# Patient Record
Sex: FEMALE | Race: WHITE | URBAN - METROPOLITAN AREA
[De-identification: names, ages, dates, MRNs, and addresses within clinical notes are randomized per-mention and may not be internally consistent; named-entity substitution may affect disease eponyms.]

---

## 2017-03-29 ENCOUNTER — ONCOLOGY VISIT (OUTPATIENT)
Dept: ONCOLOGY | Facility: CLINIC | Age: 52
End: 2017-03-29
Attending: NURSE PRACTITIONER
Payer: COMMERCIAL

## 2017-03-29 VITALS
TEMPERATURE: 98.1 F | RESPIRATION RATE: 18 BRPM | OXYGEN SATURATION: 97 % | WEIGHT: 280.7 LBS | HEIGHT: 69 IN | HEART RATE: 76 BPM | DIASTOLIC BLOOD PRESSURE: 84 MMHG | SYSTOLIC BLOOD PRESSURE: 121 MMHG | BODY MASS INDEX: 41.57 KG/M2

## 2017-03-29 DIAGNOSIS — Z08 ENCOUNTER FOR FOLLOW-UP SURVEILLANCE OF CERVICAL CANCER: Primary | ICD-10-CM

## 2017-03-29 DIAGNOSIS — Z85.41 ENCOUNTER FOR FOLLOW-UP SURVEILLANCE OF CERVICAL CANCER: Primary | ICD-10-CM

## 2017-03-29 PROCEDURE — 99212 OFFICE O/P EST SF 10 MIN: CPT | Mod: ZF

## 2017-03-29 PROCEDURE — 99213 OFFICE O/P EST LOW 20 MIN: CPT | Mod: ZP | Performed by: NURSE PRACTITIONER

## 2017-03-29 RX ORDER — IBUPROFEN 800 MG
400 TABLET ORAL DAILY
COMMUNITY

## 2017-03-29 RX ORDER — DIPHENOXYLATE HYDROCHLORIDE AND ATROPINE SULFATE 2.5; .025 MG/1; MG/1
1 TABLET ORAL DAILY
COMMUNITY

## 2017-03-29 RX ORDER — ATORVASTATIN CALCIUM 40 MG/1
40 TABLET, FILM COATED ORAL DAILY
COMMUNITY

## 2017-03-29 ASSESSMENT — PAIN SCALES - GENERAL: PAINLEVEL: NO PAIN (1)

## 2017-03-29 NOTE — MR AVS SNAPSHOT
"              After Visit Summary   3/29/2017    Hanh Corea    MRN: [id number]           Patient Information     Date Of Birth          1965        Visit Information        Provider Department      3/29/2017 2:40 PM Cathy Dickerson APRN CNP [facility]        Today's Diagnoses     Encounter for follow-up surveillance of cervical cancer    -  1       Follow-ups after your visit        Who to contact     If you have questions or need follow up information about today's clinic visit or your schedule please contact [facility] directly at [phone].  Normal or non-critical lab and imaging results will be communicated to you by collegefeedhart, letter or phone within 4 business days after the clinic has received the results. If you do not hear from us within 7 days, please contact the clinic through collegefeedhart or phone. If you have a critical or abnormal lab result, we will notify you by phone as soon as possible.  Submit refill requests through [employer] or call your pharmacy and they will forward the refill request to us. Please allow 3 business days for your refill to be completed.          Additional Information About Your Visit        MyChart Information     [employer] gives you secure access to your electronic health record. If you see a primary care provider, you can also send messages to your care team and make appointments. If you have questions, please call your primary care clinic.  If you do not have a primary care provider, please call [phone] and they will assist you.        Care EveryWhere ID     This is your Care EveryWhere ID. This could be used by other organizations to access your [facility] medical records  [id number]        Your Vitals Were     Pulse Temperature Respirations Height Last Period Pulse Oximetry    76 98.1  F (36.7  C) (Oral) 18 1.765 m (5' 9.49\") 12/23/2013 97%    BMI (Body Mass Index)                   40.87 kg/m2            Blood Pressure " from Last 3 Encounters:   03/29/17 121/84   06/08/16 179/80   10/20/15 136/83    Weight from Last 3 Encounters:   03/29/17 127.3 kg (280 lb 11.2 oz)   06/08/16 128.7 kg (283 lb 11.7 oz)   10/20/15 133 kg (293 lb 4.8 oz)              Today, you had the following     No orders found for display         Today's Medication Changes          These changes are accurate as of: 3/29/17  3:34 PM.  If you have any questions, ask your nurse or doctor.               Stop taking these medicines if you haven't already. Please contact your care team if you have questions.     simvastatin 40 MG tablet   Commonly known as:  ZOCOR   Stopped by:  Cathy Dickerson APRN CNP                    Primary Care Provider Office Phone # Fax #    Michelle Roberts [phone]       [facility] [address] [address]        Thank you!     Thank you for choosing [facility]  for your care. Our goal is always to provide you with excellent care. Hearing back from our patients is one way we can continue to improve our services. Please take a few minutes to complete the written survey that you may receive in the mail after your visit with us. Thank you!             Your Updated Medication List - Protect others around you: Learn how to safely use, store and throw away your medicines at www.disposemymeds.org.          This list is accurate as of: 3/29/17  3:34 PM.  Always use your most recent med list.                   Brand Name Dispense Instructions for use    aspirin 81 MG tablet      Take 81 mg by mouth daily       atorvastatin 40 MG tablet    LIPITOR     Take 40 mg by mouth daily       cycloSPORINE 0.05 % ophthalmic emulsion    RESTASIS     1 drop 2 times daily       flecainide 100 MG tablet    TAMBOCOR     Take 100 mg by mouth daily.       LANTUS SOLOSTAR 100 UNIT/ML injection   Generic drug:  insulin glargine     1 Month    Inject 60 units SQ at bedtime.       MAXIMUM RED  KRILL PO      Take 1 tablet by mouth daily       METFORMIN HCL PO      Take 1,000 mg by mouth 2 times daily (with meals)       MULTI-VITAMINS Tabs      Take 1 tablet by mouth daily       NovoLOG FLEXpen 100 UNITS/ML injection   Generic drug:  insulin aspart     1 Month    Inject 2.5 units/15 gms CHO with meals and snacks plus correction with meals.       ULTRA COQ10 75 MG Caps   Generic drug:  Ubiquinone      Take 75 mg by mouth daily       Vitamin D3 400 UNITS Caps      Take 400 Units by mouth daily
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
## 2017-06-17 ENCOUNTER — HEALTH MAINTENANCE LETTER (OUTPATIENT)
Age: 52
End: 2017-06-17

## 2017-09-23 ENCOUNTER — HEALTH MAINTENANCE LETTER (OUTPATIENT)
Age: 52
End: 2017-09-23

## 2017-12-30 ENCOUNTER — HEALTH MAINTENANCE LETTER (OUTPATIENT)
Age: 52
End: 2017-12-30

## 2018-03-31 ENCOUNTER — HEALTH MAINTENANCE LETTER (OUTPATIENT)
Age: 53
End: 2018-03-31

## 2018-07-07 ENCOUNTER — HEALTH MAINTENANCE LETTER (OUTPATIENT)
Age: 53
End: 2018-07-07

## 2018-10-13 ENCOUNTER — HEALTH MAINTENANCE LETTER (OUTPATIENT)
Age: 53
End: 2018-10-13

## 2019-02-15 ENCOUNTER — HEALTH MAINTENANCE LETTER (OUTPATIENT)
Age: 54
End: 2019-02-15

## 2019-10-01 ENCOUNTER — HEALTH MAINTENANCE LETTER (OUTPATIENT)
Age: 54
End: 2019-10-01

## 2019-11-08 ENCOUNTER — ONCOLOGY VISIT (OUTPATIENT)
Dept: ONCOLOGY | Facility: CLINIC | Age: 54
End: 2019-11-08
Attending: NURSE PRACTITIONER
Payer: COMMERCIAL

## 2019-11-08 VITALS
BODY MASS INDEX: 38.22 KG/M2 | RESPIRATION RATE: 16 BRPM | SYSTOLIC BLOOD PRESSURE: 144 MMHG | TEMPERATURE: 99.1 F | HEART RATE: 75 BPM | DIASTOLIC BLOOD PRESSURE: 82 MMHG | WEIGHT: 267 LBS | HEIGHT: 70 IN | OXYGEN SATURATION: 97 %

## 2019-11-08 DIAGNOSIS — Z08 ENCOUNTER FOR FOLLOW-UP SURVEILLANCE OF CERVICAL CANCER: Primary | ICD-10-CM

## 2019-11-08 DIAGNOSIS — Z85.41 ENCOUNTER FOR FOLLOW-UP SURVEILLANCE OF CERVICAL CANCER: Primary | ICD-10-CM

## 2019-11-08 PROCEDURE — G0463 HOSPITAL OUTPT CLINIC VISIT: HCPCS | Mod: ZF

## 2019-11-08 PROCEDURE — 99213 OFFICE O/P EST LOW 20 MIN: CPT | Mod: ZP | Performed by: NURSE PRACTITIONER

## 2019-11-08 PROCEDURE — G0145 SCR C/V CYTO,THINLAYER,RESCR: HCPCS | Performed by: NURSE PRACTITIONER

## 2019-11-08 RX ORDER — DILTIAZEM HCL 60 MG
TABLET ORAL
COMMUNITY
Start: 2019-10-22

## 2019-11-08 ASSESSMENT — ENCOUNTER SYMPTOMS
BACK PAIN: 1
LIGHT-HEADEDNESS: 0
EXERCISE INTOLERANCE: 0
HYPERTENSION: 0
ORTHOPNEA: 0
SLEEP DISTURBANCES DUE TO BREATHING: 0
STIFFNESS: 0
LEG PAIN: 0
JOINT SWELLING: 0
MUSCLE CRAMPS: 1
HYPOTENSION: 0
MYALGIAS: 1
NECK PAIN: 0
SYNCOPE: 0
ARTHRALGIAS: 0
MUSCLE WEAKNESS: 0
PALPITATIONS: 1

## 2019-11-08 ASSESSMENT — PAIN SCALES - GENERAL: PAINLEVEL: NO PAIN (0)

## 2019-11-08 ASSESSMENT — MIFFLIN-ST. JEOR: SCORE: 1890.73

## 2019-11-08 NOTE — PROGRESS NOTES
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 She completed treatment with surgery, EBRT, and brachytherapy on 14. She presents today for a six month surveillance visit.    HPI: Hanh comes to the clinic feeling well. She has been following with her PCP at home, but returns because she is concerned about \"extra vaginal tissue\" and the inability to perform a Pap smear. No vaginal bleeding or pelvic pain. Notes \"extra tissue\" began to grow after her radiation despite the use of a dilator. Not sexually active. Up to date on influenza vaccine. Denies unintended weight loss, fatigue, weakness, changes in vision or hearing, shortness of breath, cough, chest pain, abdominal pain, dyspepsia, nausea, vomiting, constipation, diarrhea, bloating, dysuria, urinary frequency or urgency, hematuria, pelvic pain, lower back pain, vaginal bleeding, vaginal discharge, numbness or tingling, or swelling of the extremities.      Brief Oncology History:  Initially presented for evaluation of increased vaginal discharge and spotting between cycles in 2013. She states that she has had some discharge over the last year. The discharge has been odorous, and she needs to wear a pad throughout the day. Sometimes some bleeding noted with the discharge. Her PCP referred her to OB/GYN, and ordered pelvic US. On 13 US showed borderline thickening of endometrial stripe (1.6 cm) which was heterogenous and contained small cystic spaces, Nabothian cysts noted throughout. Uterus measured 9.2 x 6 x 6.9 cm. Endometrial biopsy on 1/15/14, noted that the cervix was extremely friable. Endometrial biopsy showed adenocarcinoma, endometrioid type, villoglandular variant, FIGO grade 1. On 14, she followed up with gyn, who noted that there was an " exophytic friable fungating lesion on anterior portion of pt's cervix. Cervical polyp biopsy was obtained. This returned adenocarcinoma, endocervical vs endometrial. 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   1/28/14 CT C/A/P Impression:   1. Mild thickening of the lower uterine segment and cervix most likely corresponds to the patient's known endometrial and cervical adenocarcinoma.  2. No evidence for metastasis in the chest, abdomen pelvis.  3. Incidental short segment small bowel-small bowel intussusception without evidence for small bowel obstruction. Most of this intussusceptions are transient.  2/11/14 Exploratory Laparotomy, total abdominal hysterectomy, bilateral salpingo-oopharectomy, bilateral pelvic and periaortic lymph node dissection, omental biopsy. The surgery was technically difficult due to morbid obesity with BMI of 40.0. Large amount of subcutaneous and intraperitoneal adiposity   SYNOPTIC SUMMARY:  Specimen: Uterus, cervix, bilateral fallopian tubes and ovaries, pelvic and periaortic lymph nodes.  Procedure: Total abdominal hysterectomy, bilateral salpingo-oophorectomy, pelvic and periaortic lymph node dissection, omentectomy.  Tumor size: 2.5 x 1.5 cm.  Tumor site: Endocervix.  Histologic type: Adenocarcinoma.  Histologic grade: G2, well differentiated.  Stromal invasion: Depth: 4 mm.  Horizontal extent: 25 mm.  Margins: Uninvolved by invasive carcinoma.  Lymph-vascular invasion: Not identified.  Pathologic staging (pTNM):  Primary tumor (pT): pT1b1: Clinically visible lesion less than or equal to 4.0 cm in greatest dimension.  Regional lymph nodes (pN): pN0: No regional lymph node metastasis.  Number of lymph nodes examined: 20.  Number of lymph nodes involved: 0.  Distant metastasis (pM): Not applicable.  Additional pathologic findings: Benign endometrial polyp formation and leiomyoma.  2/26/14: On 2/22, she was hospitalized in [place] for ileus.  6/9/14: Radiation oncology  consultation. S/P External beam Therapy in [place] subsequently referred for vaginal cuff boost at [facility]. Her indications for postoperative radiotherapy include adeoncarcinoma subtype as well as the fact that she had a simple hysterectomy. She has had 4500 cGy of EBT which was poorly tolerated. The vaginal cuff is a possible site of recurrence and a cuff boost may decrease the risk of recurrence there. Had she had a radical hysterectomy the upper 1/3 of the vagina is taken and in that case I do not usually boost with brachy. In this situation vag cuff brachy wasrecommended. Recommended imodium prn diarrhea. Diet modifications help to control stool. Has dilator for use. Anal fissure most irritating; just started steroid cream.   6/11/14: Completed 3 brachytherapy treatments. Mood intermittently depressed but has supportive family. She has recently increased her dose of Zoloft to 100 mg per PCP.   8/27/14: Completed radiation. Diarrhea has resolved. Pain with defecation. No rectal bleeding. History anal fissure by history only, not exam. Uses steroid cream prn. Notes deep hip pain she feels is r/t radiation. No libido and has insertional and deep dyspareunia. Tried lubrication. Has not tried estrogen cream. Trying to use dilator. Continues on Zoloft 100 mg. Has occasional dilator use. Has not had post treatment imaging since completing treatment 7/14.      11/05/14 CT C/A/P impression: 1) Mild circumferential rectal wall thickening and surrounding inflammatory change. Findings are nonspecific. 2) 1.5 cm lytic lesion containing soft tissue and gas in the anterior left scapula is most likely degenerative in etiology and appears nonaggressive. This could be followed with plain film. 3) Resolution of abdominal abscess since prior CT.     01/27/15 3 month surveillance visit for review of CT scan and PAP/Pelvic. Atypical-glandular cells(NOS). Other Non-Neoplastic Findings: Reactive cellular changes associated with  radiation.     2/10/15: Colpo with vaginal cuff bx: FINAL DIAGNOSIS: Vagina, apex, biopsy: -Necrosis, ulceration, and changes consistent with radiation      7/21/15: 3 month follow up visit.   10/20/15: 3 month follow up visit. Feeling well. Decrease libido and difficulty with arousal.   6/8/16: Pap NIL, HPV negative    Past Medical History:   Diagnosis Date     AV junctional tachycardia (H)     History of junctional tachycardia and SVT with ablation in [place] x 2. Follows with [facility].      Depression      Diabetes (H)      Endocervical adenocarcinoma (H) 2/11/14     Hyperlipidemia      Sleep apnea        Past Surgical History:   Procedure Laterality Date     CARPAL TUNNEL RELEASE RT/LT       CHOLECYSTECTOMY       GYN SURGERY       HYSTERECTOMY TOTAL ABDOMINAL, BILATERAL SALPINGO-OOPHORECTOMY, NODE DISSECTION, COMBINED  2/11/2014    Procedure: COMBINED HYSTERECTOMY TOTAL ABDOMINAL, SALPINGO-OOPHORECTOMY, NODE DISSECTION;  Exploratory Laparotomy, Total Abdominal Hysterectomy, Bilateral Salpingo-Oophrectomy, Pelvic And Para-Aortic Lymph Node Dissection, Exam Under Anesthesia, Omental Biopsy;  Surgeon: Karen Bella MD;  Location:  OR       Social History     Socioeconomic History     Marital status:      Spouse name: Not on file     Number of children: Not on file     Years of education: Not on file     Highest education level: Not on file   Occupational History     Not on file   Social Needs     Financial resource strain: Not on file     Food insecurity:     Worry: Not on file     Inability: Not on file     Transportation needs:     Medical: Not on file     Non-medical: Not on file   Tobacco Use     Smoking status: Never Smoker     Smokeless tobacco: Never Used   Substance and Sexual Activity     Alcohol use: No     Drug use: No     Sexual activity: Not on file   Lifestyle     Physical activity:     Days per week: Not on file     Minutes per session: Not on file     Stress: Not on file    Relationships     Social connections:     Talks on phone: Not on file     Gets together: Not on file     Attends Taoist service: Not on file     Active member of club or organization: Not on file     Attends meetings of clubs or organizations: Not on file     Relationship status: Not on file     Intimate partner violence:     Fear of current or ex partner: Not on file     Emotionally abused: Not on file     Physically abused: Not on file     Forced sexual activity: Not on file   Other Topics Concern     Not on file   Social History Narrative     Not on file       Family History   Problem Relation Age of Onset     Cancer Father         colon cancer     Cancer Paternal Grandfather         prostate  cancer       Current Outpatient Medications   Medication     aspirin 81 MG tablet     atorvastatin (LIPITOR) 40 MG tablet     Cholecalciferol (VITAMIN D3) 400 UNITS CAPS     cycloSPORINE (RESTASIS) 0.05 % ophthalmic emulsion     flecainide (TAMBOCOR) 100 MG tablet     insulin glargine (LANTUS SOLOSTAR) SOLN 100 UNIT/ML     Krill Oil (MAXIMUM RED KRILL PO)     METFORMIN HCL PO     Multiple Vitamin (MULTI-VITAMINS) TABS     NOVOLOG FLEXPEN SOLN 100 UNIT/ML     Ubiquinone (ULTRA COQ10) 75 MG CAPS     No current facility-administered medications for this visit.           Allergies   Allergen Reactions     Morphine Itching     Sulfa Drugs Nausea     [place]etta Itching and Rash     Exenatide Rash           Review of Systems  Answers for HPI/ROS submitted by the patient on 11/8/2019   General Symptoms: No  Skin Symptoms: No  HENT Symptoms: No  EYE SYMPTOMS: No  HEART SYMPTOMS: Yes  LUNG SYMPTOMS: No  INTESTINAL SYMPTOMS: No  URINARY SYMPTOMS: No  GYNECOLOGIC SYMPTOMS: No  BREAST SYMPTOMS: No  SKELETAL SYMPTOMS: Yes  BLOOD SYMPTOMS: No  NERVOUS SYSTEM SYMPTOMS: No  MENTAL HEALTH SYMPTOMS: No  Chest pain or pressure: No  Fast or irregular heartbeat: Yes  Pain in legs with walking: No  Trouble breathing while lying down: No  Fingers  "or toes appear blue: No  High blood pressure: No  Low blood pressure: No  Fainting: No  Murmurs: No  Pacemaker: No  Varicose veins: No  Edema or swelling: No  Wake up at night with shortness of breath: No  Light-headedness: No  Exercise intolerance: No  Back pain: Yes  Muscle aches: Yes  Neck pain: No  Swollen joints: No  Joint pain: No  Bone pain: No  Muscle cramps: Yes  Muscle weakness: No  Joint stiffness: No  Bone fracture: No    I have reviewed the patient's ROS and discussed all pertinent information as noted in the HPI.      OBJECTIVE:    Physical Exam:    BP (!) 144/82   Pulse 75   Temp 99.1  F (37.3  C) (Oral)   Resp 16   Ht 1.769 m (5' 9.65\")   Wt 121.1 kg (267 lb)   LMP 12/23/2013   SpO2 97%   BMI 38.70 kg/m      CONSTITUTIONAL: Alert non-toxic appearing female in no acute distress  HEAD: Normocephalic, atraumatic  NECK: Neck supple without lymphadenopathy  RESPIRATORY: Lungs clear to auscultation, no increased work of breathing noted  CV: Regular rate and rhythm, S1S2, no clicks, murmurs, rubs, or gallops; bilateral lower extremities without edema, dorsalis pedis pulses 2+ bilaterally  GASTROINTESTINAL: Normoactive bowel sounds x4 quadrants, abdomen obese, soft, non-distended, and non-tender to palpation without masses or organomegaly  GENITOURINARY: External genitalia and urethral meatus pink without lesions, masses, or excoriation. Vagina pale consistent with radiation changes, scarring noted to apex, shortened to roughly 2cm. Vaginal cuff without nodularity, masses, or lesions. Cervix surgically absent. Vaginal Pap obtained Bimanual exam reveals no masses or fullness. Rectovaginal exam confirms these findings.  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 Continue surveillance annually with Pap and pelvic/rectal exam. Today's Pap is pending. Reviewed signs and symptoms  of recurrence including but not limited to bleeding from vagina, bladder, or rectum, bloating, early satiety, swelling in the lower extremities, abdominal or lower back pain, changes in bowel or bladder patterns, shortness of breath, increased fatigue, unexplained weight loss, and night sweats. To restart dilator use three times weekly for five minutes at a time to prevent further vaginal shortening post radiation- given two different dilator sizes. Reviewed signs and symptoms for when she should contact the clinic or seek additional care. Patient to contact the clinic with any questions or concerns in the interim. We reviewed that she does not have \"extra vaginal tissue\"- she has foreshortening due to her history of radiation but this should not preclude a vaginal Pap. Given cancer treatment summary for her and her PCP.  2) Genetic testing: Risk factors have been assessed and the patient does not meet qualifications for genetic counseling based on NCCN guidelines.   3) Health maintenance issues discussed today include to follow up with PCP for co-morbid conditions and non-gynecologic issues. To have mammogram.  4) Patient verbalized understanding of and agreement with plan.    A total of 15 minutes of face to face time spent with patient, over 50% of which was spent in counseling and coordination of care.    ZULEMA Vaughn, FNP-C  Division of Gynecologic Oncology  [facility]  Pager: [phone]            "

## 2019-11-08 NOTE — LETTER
"2019       RE: Hanh Corea  [address] [facility] [address]     Dear Colleague,    Thank you for referring your patient, Hanh Corea, to the [facility]. Please see a copy of my visit note below.    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 She completed treatment with surgery, EBRT, and brachytherapy on 14. She presents today for a six month surveillance visit.    HPI: Hanh comes to the clinic feeling well. She has been following with her PCP at home, but returns because she is concerned about \"extra vaginal tissue\" and the inability to perform a Pap smear. No vaginal bleeding or pelvic pain. Notes \"extra tissue\" began to grow after her radiation despite the use of a dilator. Not sexually active. Up to date on influenza vaccine. Denies unintended weight loss, fatigue, weakness, changes in vision or hearing, shortness of breath, cough, chest pain, abdominal pain, dyspepsia, nausea, vomiting, constipation, diarrhea, bloating, dysuria, urinary frequency or urgency, hematuria, pelvic pain, lower back pain, vaginal bleeding, vaginal discharge, numbness or tingling, or swelling of the extremities.      Brief Oncology History:  Initially presented for evaluation of increased vaginal discharge and spotting between cycles in 2013. She states that she has had some discharge over the last year. The discharge has been odorous, and she needs to wear a pad throughout the day. Sometimes some bleeding noted with the discharge. Her PCP referred her to OB/GYN, and ordered pelvic US. On 13 US showed borderline thickening of endometrial stripe (1.6 cm) which was heterogenous and contained small cystic spaces, Nabothian cysts noted throughout. Uterus measured 9.2 x 6 x 6.9 cm. " Endometrial biopsy on 1/15/14, noted that the cervix was extremely friable. Endometrial biopsy showed adenocarcinoma, endometrioid type, villoglandular variant, FIGO grade 1. On 1/16/14, she followed up with gyn, who noted that there was an exophytic friable fungating lesion on anterior portion of pt's cervix. Cervical polyp biopsy was obtained. This returned adenocarcinoma, endocervical vs endometrial. 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   1/28/14 CT C/A/P Impression:   1. Mild thickening of the lower uterine segment and cervix most likely corresponds to the patient's known endometrial and cervical adenocarcinoma.  2. No evidence for metastasis in the chest, abdomen pelvis.  3. Incidental short segment small bowel-small bowel intussusception without evidence for small bowel obstruction. Most of this intussusceptions are transient.  2/11/14 Exploratory Laparotomy, total abdominal hysterectomy, bilateral salpingo-oopharectomy, bilateral pelvic and periaortic lymph node dissection, omental biopsy. The surgery was technically difficult due to morbid obesity with BMI of 40.0. Large amount of subcutaneous and intraperitoneal adiposity   SYNOPTIC SUMMARY:  Specimen: Uterus, cervix, bilateral fallopian tubes and ovaries, pelvic and periaortic lymph nodes.  Procedure: Total abdominal hysterectomy, bilateral salpingo-oophorectomy, pelvic and periaortic lymph node dissection, omentectomy.  Tumor size: 2.5 x 1.5 cm.  Tumor site: Endocervix.  Histologic type: Adenocarcinoma.  Histologic grade: G2, well differentiated.  Stromal invasion: Depth: 4 mm.  Horizontal extent: 25 mm.  Margins: Uninvolved by invasive carcinoma.  Lymph-vascular invasion: Not identified.  Pathologic staging (pTNM):  Primary tumor (pT): pT1b1: Clinically visible lesion less than or equal to 4.0 cm in greatest dimension.  Regional lymph nodes (pN): pN0: No regional lymph node metastasis.  Number of lymph nodes examined: 20.  Number of lymph  nodes involved: 0.  Distant metastasis (pM): Not applicable.  Additional pathologic findings: Benign endometrial polyp formation and leiomyoma.  2/26/14: On 2/22, she was hospitalized in [place] for ileus.  6/9/14: Radiation oncology consultation. S/P External beam Therapy in [place] subsequently referred for vaginal cuff boost at [facility]. Her indications for postoperative radiotherapy include adeoncarcinoma subtype as well as the fact that she had a simple hysterectomy. She has had 4500 cGy of EBT which was poorly tolerated. The vaginal cuff is a possible site of recurrence and a cuff boost may decrease the risk of recurrence there. Had she had a radical hysterectomy the upper 1/3 of the vagina is taken and in that case I do not usually boost with brachy. In this situation vag cuff brachy wasrecommended. Recommended imodium prn diarrhea. Diet modifications help to control stool. Has dilator for use. Anal fissure most irritating; just started steroid cream.   6/11/14: Completed 3 brachytherapy treatments. Mood intermittently depressed but has supportive family. She has recently increased her dose of Zoloft to 100 mg per PCP.   8/27/14: Completed radiation. Diarrhea has resolved. Pain with defecation. No rectal bleeding. History anal fissure by history only, not exam. Uses steroid cream prn. Notes deep hip pain she feels is r/t radiation. No libido and has insertional and deep dyspareunia. Tried lubrication. Has not tried estrogen cream. Trying to use dilator. Continues on Zoloft 100 mg. Has occasional dilator use. Has not had post treatment imaging since completing treatment 7/14.      11/05/14 CT C/A/P impression: 1) Mild circumferential rectal wall thickening and surrounding inflammatory change. Findings are nonspecific. 2) 1.5 cm lytic lesion containing soft tissue and gas in the anterior left scapula is most likely degenerative in etiology and appears nonaggressive. This could be followed with plain film. 3)  Resolution of abdominal abscess since prior CT.     01/27/15 3 month surveillance visit for review of CT scan and PAP/Pelvic. Atypical-glandular cells(NOS). Other Non-Neoplastic Findings: Reactive cellular changes associated with radiation.     2/10/15: Colpo with vaginal cuff bx: FINAL DIAGNOSIS: Vagina, apex, biopsy: -Necrosis, ulceration, and changes consistent with radiation      7/21/15: 3 month follow up visit.   10/20/15: 3 month follow up visit. Feeling well. Decrease libido and difficulty with arousal.   6/8/16: Pap NIL, HPV negative    Past Medical History:   Diagnosis Date     AV junctional tachycardia (H)     History of junctional tachycardia and SVT with ablation in [place] x 2. Follows with [facility].      Depression      Diabetes (H)      Endocervical adenocarcinoma (H) 2/11/14     Hyperlipidemia      Sleep apnea        Past Surgical History:   Procedure Laterality Date     CARPAL TUNNEL RELEASE RT/LT       CHOLECYSTECTOMY       GYN SURGERY       HYSTERECTOMY TOTAL ABDOMINAL, BILATERAL SALPINGO-OOPHORECTOMY, NODE DISSECTION, COMBINED  2/11/2014    Procedure: COMBINED HYSTERECTOMY TOTAL ABDOMINAL, SALPINGO-OOPHORECTOMY, NODE DISSECTION;  Exploratory Laparotomy, Total Abdominal Hysterectomy, Bilateral Salpingo-Oophrectomy, Pelvic And Para-Aortic Lymph Node Dissection, Exam Under Anesthesia, Omental Biopsy;  Surgeon: Karen Bella MD;  Location:  OR       Social History     Socioeconomic History     Marital status:      Spouse name: Not on file     Number of children: Not on file     Years of education: Not on file     Highest education level: Not on file   Occupational History     Not on file   Social Needs     Financial resource strain: Not on file     Food insecurity:     Worry: Not on file     Inability: Not on file     Transportation needs:     Medical: Not on file     Non-medical: Not on file   Tobacco Use     Smoking status: Never Smoker     Smokeless tobacco: Never Used    Substance and Sexual Activity     Alcohol use: No     Drug use: No     Sexual activity: Not on file   Lifestyle     Physical activity:     Days per week: Not on file     Minutes per session: Not on file     Stress: Not on file   Relationships     Social connections:     Talks on phone: Not on file     Gets together: Not on file     Attends Temple service: Not on file     Active member of club or organization: Not on file     Attends meetings of clubs or organizations: Not on file     Relationship status: Not on file     Intimate partner violence:     Fear of current or ex partner: Not on file     Emotionally abused: Not on file     Physically abused: Not on file     Forced sexual activity: Not on file   Other Topics Concern     Not on file   Social History Narrative     Not on file       Family History   Problem Relation Age of Onset     Cancer Father         colon cancer     Cancer Paternal Grandfather         prostate  cancer       Current Outpatient Medications   Medication     aspirin 81 MG tablet     atorvastatin (LIPITOR) 40 MG tablet     Cholecalciferol (VITAMIN D3) 400 UNITS CAPS     cycloSPORINE (RESTASIS) 0.05 % ophthalmic emulsion     flecainide (TAMBOCOR) 100 MG tablet     insulin glargine (LANTUS SOLOSTAR) SOLN 100 UNIT/ML     Krill Oil (MAXIMUM RED KRILL PO)     METFORMIN HCL PO     Multiple Vitamin (MULTI-VITAMINS) TABS     NOVOLOG FLEXPEN SOLN 100 UNIT/ML     Ubiquinone (ULTRA COQ10) 75 MG CAPS     No current facility-administered medications for this visit.           Allergies   Allergen Reactions     Morphine Itching     Sulfa Drugs Nausea     Byetta Itching and Rash     Exenatide Rash           Review of Systems  Answers for HPI/ROS submitted by the patient on 11/8/2019   General Symptoms: No  Skin Symptoms: No  HENT Symptoms: No  EYE SYMPTOMS: No  HEART SYMPTOMS: Yes  LUNG SYMPTOMS: No  INTESTINAL SYMPTOMS: No  URINARY SYMPTOMS: No  GYNECOLOGIC SYMPTOMS: No  BREAST SYMPTOMS: No  SKELETAL  "SYMPTOMS: Yes  BLOOD SYMPTOMS: No  NERVOUS SYSTEM SYMPTOMS: No  MENTAL HEALTH SYMPTOMS: No  Chest pain or pressure: No  Fast or irregular heartbeat: Yes  Pain in legs with walking: No  Trouble breathing while lying down: No  Fingers or toes appear blue: No  High blood pressure: No  Low blood pressure: No  Fainting: No  Murmurs: No  Pacemaker: No  Varicose veins: No  Edema or swelling: No  Wake up at night with shortness of breath: No  Light-headedness: No  Exercise intolerance: No  Back pain: Yes  Muscle aches: Yes  Neck pain: No  Swollen joints: No  Joint pain: No  Bone pain: No  Muscle cramps: Yes  Muscle weakness: No  Joint stiffness: No  Bone fracture: No    I have reviewed the patient's ROS and discussed all pertinent information as noted in the HPI.      OBJECTIVE:    Physical Exam:    BP (!) 144/82   Pulse 75   Temp 99.1  F (37.3  C) (Oral)   Resp 16   Ht 1.769 m (5' 9.65\")   Wt 121.1 kg (267 lb)   LMP 12/23/2013   SpO2 97%   BMI 38.70 kg/m       CONSTITUTIONAL: Alert non-toxic appearing female in no acute distress  HEAD: Normocephalic, atraumatic  NECK: Neck supple without lymphadenopathy  RESPIRATORY: Lungs clear to auscultation, no increased work of breathing noted  CV: Regular rate and rhythm, S1S2, no clicks, murmurs, rubs, or gallops; bilateral lower extremities without edema, dorsalis pedis pulses 2+ bilaterally  GASTROINTESTINAL: Normoactive bowel sounds x4 quadrants, abdomen obese, soft, non-distended, and non-tender to palpation without masses or organomegaly  GENITOURINARY: External genitalia and urethral meatus pink without lesions, masses, or excoriation. Vagina pale consistent with radiation changes, scarring noted to apex, shortened to roughly 2cm. Vaginal cuff without nodularity, masses, or lesions. Cervix surgically absent. Vaginal Pap obtained Bimanual exam reveals no masses or fullness. Rectovaginal exam confirms these findings.  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 Continue surveillance annually with Pap and pelvic/rectal exam. Today's Pap is pending. Reviewed signs and symptoms  of recurrence including but not limited to bleeding from vagina, bladder, or rectum, bloating, early satiety, swelling in the lower extremities, abdominal or lower back pain, changes in bowel or bladder patterns, shortness of breath, increased fatigue, unexplained weight loss, and night sweats. To restart dilator use three times weekly for five minutes at a time to prevent further vaginal shortening post radiation- given two different dilator sizes. Reviewed signs and symptoms for when she should contact the clinic or seek additional care. Patient to contact the clinic with any questions or concerns in the interim. We reviewed that she does not have \"extra vaginal tissue\"- she has foreshortening due to her history of radiation but this should not preclude a vaginal Pap. Given cancer treatment summary for her and her PCP.  2) Genetic testing: Risk factors have been assessed and the patient does not meet qualifications for genetic counseling based on NCCN guidelines.   3) Health maintenance issues discussed today include to follow up with PCP for co-morbid conditions and non-gynecologic issues. To have mammogram.  4) Patient verbalized understanding of and agreement with plan.    A total of 15 minutes of face to face time spent with patient, over 50% of which was spent in counseling and coordination of care.    ZULEMA Vaughn, FNP-C  Division of " Gynecologic Oncology  [facility]  Pager: [phone]

## 2019-11-08 NOTE — NURSING NOTE
"Oncology Rooming Note    November 8, 2019 2:32 PM   Hanh Corea is a 54 year old female who presents for:    Chief Complaint   Patient presents with     Oncology Clinic Visit     Return Cervical Ca     Initial Vitals: BP (!) 144/82   Pulse 75   Temp 99.1  F (37.3  C) (Oral)   Resp 16   Ht 1.769 m (5' 9.65\")   Wt 121.1 kg (267 lb)   LMP 12/23/2013   SpO2 97%   BMI 38.70 kg/m   Estimated body mass index is 38.7 kg/m  as calculated from the following:    Height as of this encounter: 1.769 m (5' 9.65\").    Weight as of this encounter: 121.1 kg (267 lb). Body surface area is 2.44 meters squared.  No Pain (0) Comment: Data Unavailable   Patient's last menstrual period was 12/23/2013.  Allergies reviewed: Yes  Medications reviewed: Yes    Medications: Medication refills not needed today.  Pharmacy name entered into [employer]: [id number] [address]    Clinical concerns: Patient has some questions about what her primary gyn needs to do to follow up with patient; extra tissue in vagina;        Lana Clark [facility]              "

## 2019-11-12 LAB
COPATH REPORT: NORMAL
PAP: NORMAL

## 2019-12-15 ENCOUNTER — HEALTH MAINTENANCE LETTER (OUTPATIENT)
Age: 54
End: 2019-12-15

## 2021-01-15 ENCOUNTER — HEALTH MAINTENANCE LETTER (OUTPATIENT)
Age: 56
End: 2021-01-15

## 2021-01-23 ENCOUNTER — HEALTH MAINTENANCE LETTER (OUTPATIENT)
Age: 56
End: 2021-01-23

## 2021-03-20 ENCOUNTER — HEALTH MAINTENANCE LETTER (OUTPATIENT)
Age: 56
End: 2021-03-20

## 2021-09-04 ENCOUNTER — HEALTH MAINTENANCE LETTER (OUTPATIENT)
Age: 56
End: 2021-09-04

## 2022-02-19 ENCOUNTER — HEALTH MAINTENANCE LETTER (OUTPATIENT)
Age: 57
End: 2022-02-19

## 2022-03-25 NOTE — PROGRESS NOTES
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 She completed treatment with surgery, EBRT, and brachytherapy on 14. She is here today for follow up and surveillance.     Brief Oncology History:  Initially presented for evaluation of increased vaginal discharge and spotting between cycles in 2013. She states that she has had some discharge over the last year. The discharge has been odorous, and she needs to wear a pad throughout the day. Sometimes some bleeding noted with the discharge. Her PCP referred her to OB/GYN, and ordered pelvic US. On 13 US showed borderline thickening of endometrial stripe (1.6 cm) which was heterogenous and contained small cystic spaces, Nabothian cysts noted throughout. Uterus measured 9.2 x 6 x 6.9 cm. Endometrial biopsy on 1/15/14, noted that the cervix was extremely friable. Endometrial biopsy showed adenocarcinoma, endometrioid type, villoglandular variant, FIGO grade 1. On 14, she followed up with gyn, who noted that there was an exophytic friable fungating lesion on anterior portion of pt's cervix. Cervical polyp biopsy was obtained. This returned adenocarcinoma, endocervical vs endometrial. 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   14 CT C/A/P Impression:   1. Mild thickening of the lower uterine segment and cervix most likely corresponds to the patient's known endometrial and cervical adenocarcinoma.  2. No evidence for metastasis in the chest, abdomen pelvis.  3. Incidental short segment small bowel-small bowel intussusception without evidence for small bowel obstruction. Most of this intussusceptions are transient.  14 Exploratory Laparotomy, total abdominal hysterectomy, bilateral salpingo-oopharectomy, bilateral pelvic and periaortic lymph  node dissection, omental biopsy. The surgery was technically difficult due to morbid obesity with BMI of 40.0. Large amount of subcutaneous and intraperitoneal adiposity   SYNOPTIC SUMMARY:  Specimen: Uterus, cervix, bilateral fallopian tubes and ovaries, pelvic and periaortic lymph nodes.  Procedure: Total abdominal hysterectomy, bilateral salpingo-oophorectomy, pelvic and periaortic lymph node dissection, omentectomy.  Tumor size: 2.5 x 1.5 cm.  Tumor site: Endocervix.  Histologic type: Adenocarcinoma.  Histologic grade: G2, well differentiated.  Stromal invasion: Depth: 4 mm.  Horizontal extent: 25 mm.  Margins: Uninvolved by invasive carcinoma.  Lymph-vascular invasion: Not identified.  Pathologic staging (pTNM):  Primary tumor (pT): pT1b1: Clinically visible lesion less than or equal to 4.0 cm in greatest dimension.  Regional lymph nodes (pN): pN0: No regional lymph node metastasis.  Number of lymph nodes examined: 20.  Number of lymph nodes involved: 0.  Distant metastasis (pM): Not applicable.  Additional pathologic findings: Benign endometrial polyp formation and leiomyoma.  2/26/14: On 2/22, she was hospitalized in [place] for ileus.  6/9/14: Radiation oncology consultation. S/P External beam Therapy in [place] subsequently referred for vaginal cuff boost at [facility]. Her indications for postoperative radiotherapy include adeoncarcinoma subtype as well as the fact that she had a simple hysterectomy. She has had 4500 cGy of EBT which was poorly tolerated. The vaginal cuff is a possible site of recurrence and a cuff boost may decrease the risk of recurrence there. Had she had a radical hysterectomy the upper 1/3 of the vagina is taken and in that case I do not usually boost with brachy. In this situation vag cuff brachy wasrecommended. Recommended imodium prn diarrhea. Diet modifications help to control stool. Has dilator for use. Anal fissure most irritating; just started steroid cream.   6/11/14: Completed 3  brachytherapy treatments. Mood intermittently depressed but has supportive family. She has recently increased her dose of Zoloft to 100 mg per PCP.   8/27/14: Completed radiation. Diarrhea has resolved. Pain with defecation. No rectal bleeding. History anal fissure by history only, not exam. Uses steroid cream prn. Notes deep hip pain she feels is r/t radiation. No libido and has insertional and deep dyspareunia. Tried lubrication. Has not tried estrogen cream. Trying to use dilator. Continues on Zoloft 100 mg. Has occasional dilator use. Has not had post treatment imaging since completing treatment 7/14.      11/05/14 CT C/A/P impression: 1) Mild circumferential rectal wall thickening and surrounding inflammatory change. Findings are nonspecific. 2) 1.5 cm lytic lesion containing soft tissue and gas in the anterior left scapula is most likely degenerative in etiology and appears nonaggressive. This could be followed with plain film. 3) Resolution of abdominal abscess since prior CT.     01/27/15 3 month surveillance visit for review of CT scan and PAP/Pelvic. Atypical-glandular cells(NOS). Other Non-Neoplastic Findings: Reactive cellular changes associated with radiation.     2/10/15: Colpo with vaginal cuff bx: FINAL DIAGNOSIS: Vagina, apex, biopsy: -Necrosis, ulceration, and changes consistent with radiation      7/21/15: 3 month follow up visit.   10/20/15: 3 month follow up visit. Feeling well. Decrease libido and difficulty with arousal.   6/8/16: Pap NIL, HPV negative  11/11/19: Pap NIL  3/28/2022: Pap pending           She denies any vaginal bleeding, no changes in her bowel or bladder habits, no nausea/emesis, no lower extremity edema, and no difficulties eating or sleeping. She denies any abdominal discomfort/bloating, no fevers or chills, and no chest pain or shortness of breath. She is not sexually active, not using dilator for the last few months.         Health Maintenance  Colonoscopy- 2/2021, due 5  years  Mammogram- scheduled for this summer   DEXA- due for this   Annual physical- 3/29/2022 scheduled         Review of Systems:    Systemic           no weight changes; no fever; no chills; no night sweats; no appetite changes  Skin           no rashes, or lesions  Eye           no irritation; no changes in vision  Jennifer-Laryngeal           no dysphagia; no hoarseness   Pulmonary    no cough; no shortness of breath  Cardiovascular    no chest pain; no palpitations  Gastrointestinal    no diarrhea; no constipation; no abdominal pain; no changes in bowel  habits; no blood in stool  Genitourinary   no urinary frequency; no urinary urgency; no dysuria; no pain; no abnormal vaginal discharge; no abnormal vaginal bleeding  Breast    no breast discharge; no breast changes; no breast pain  Musculoskeletal    no myalgias; no arthralgias; no back pain  Psychiatric           no depressed mood; no anxiety    Hematologic           no tender lymph nodes; no noticeable swellings or lumps   Endocrine    no hot flashes; no heat/cold intolerance         Neurological   no tremor; no numbness and tingling; no headaches; no difficulty  sleeping      Past Medical History:    Past Medical History:   Diagnosis Date     AV junctional tachycardia (H)     History of junctional tachycardia and SVT with ablation in [place] x 2. Follows with [facility].      Depression      Diabetes (H)      Endocervical adenocarcinoma (H) 2/11/14     Hyperlipidemia      Sleep apnea          Past Surgical History:    Past Surgical History:   Procedure Laterality Date     CARPAL TUNNEL RELEASE RT/LT       CHOLECYSTECTOMY       GYN SURGERY       HYSTERECTOMY TOTAL ABDOMINAL, BILATERAL SALPINGO-OOPHORECTOMY, NODE DISSECTION, COMBINED  2/11/2014    Procedure: COMBINED HYSTERECTOMY TOTAL ABDOMINAL, SALPINGO-OOPHORECTOMY, NODE DISSECTION;  Exploratory Laparotomy, Total Abdominal Hysterectomy, Bilateral Salpingo-Oophrectomy, Pelvic And Para-Aortic Lymph Node  Dissection, Exam Under Anesthesia, Omental Biopsy;  Surgeon: Karen Bella MD;  Location: [facility] OR         Health Maintenance Due   Topic Date Due     PREVENTIVE CARE VISIT  Never done     LIPID  Never done     MICROALBUMIN  Never done     DIABETIC FOOT EXAM  Never done     ADVANCE CARE PLANNING  Never done     EYE EXAM  Never done     Pneumococcal Vaccine: Pediatrics (0 to 5 Years) and At-Risk Patients (6 to 64 Years) (1 of 2 - PPSV23) Never done     HIV SCREENING  Never done     HEPATITIS C SCREENING  Never done     BMP  01/28/2015     HPV TEST  02/08/2020     MAMMO SCREENING  01/09/2021     INFLUENZA VACCINE (1) 09/01/2021     COVID-19 Vaccine (3 - Booster for Pfizer series) 09/06/2021     PHQ-2 (once per calendar year)  Never done     PAP  06/28/2022       Current Medications:     Current Outpatient Medications   Medication Sig Dispense Refill     aspirin 81 MG tablet Take 81 mg by mouth daily        atorvastatin (LIPITOR) 40 MG tablet Take 40 mg by mouth daily       Cholecalciferol (VITAMIN D3) 400 UNITS CAPS Take 400 Units by mouth daily       diltiazem (CARDIZEM) 60 MG tablet Take one tablet every 4 hours as needed for Heart rate above 110 beats per minute.       flecainide (TAMBOCOR) 100 MG tablet Take 100 mg by mouth daily.        insulin glargine (LANTUS SOLOSTAR) SOLN 100 UNIT/ML Inject 60 units SQ at bedtime. 1 Month 1     Krill Oil (MAXIMUM RED KRILL PO) Take 1 tablet by mouth daily       METFORMIN HCL PO Take 1,000 mg by mouth 2 times daily (with meals)       Multiple Vitamin (MULTI-VITAMINS) TABS Take 1 tablet by mouth daily       NOVOLOG FLEXPEN SOLN 100 UNIT/ML Inject 2.5 units/15 gms CHO with meals and snacks plus correction with meals. 1 Month 4     Ubiquinone (ULTRA COQ10) 75 MG CAPS Take 75 mg by mouth daily       cycloSPORINE (RESTASIS) 0.05 % ophthalmic emulsion 1 drop 2 times daily (Patient not taking: Reported on 3/28/2022)           Allergies:        Allergies   Allergen Reactions      Byetta Itching and Rash     Exenatide Rash and Itching     Morphine Itching     Sulfa Drugs Nausea        Social History:     Social History     Tobacco Use     Smoking status: Never Smoker     Smokeless tobacco: Never Used   Substance Use Topics     Alcohol use: No       History   Drug Use No         Family History:     The patient's family history is notable for     Family History   Problem Relation Age of Onset     Cancer Father         colon cancer     Cancer Paternal Grandfather         prostate  cancer         Physical Exam:     /78   Pulse 71   Temp 98.4  F (36.9  C) (Oral)   Wt 118.7 kg (261 lb 9.6 oz)   LMP 12/23/2013   SpO2 99%   BMI 37.92 kg/m    Body mass index is 37.92 kg/m .    General Appearance: healthy and alert, no distress     HEENT: no thyromegaly, no palpable nodules or masses        Cardiovascular: regular rate and rhythm, no gallops, rubs or murmurs     Respiratory: lungs clear, no rales, rhonchi or wheezes    Musculoskeletal: extremities non tender and without edema    Skin: no lesions or rashes     Neurological: normal gait, no gross defects     Psychiatric: appropriate mood and affect                               Hematological: normal cervical, supraclavicular and inguinal lymph nodes     Gastrointestinal:       abdomen soft, non-tender, non-distended, no organomegaly or masses    Genitourinary: External genitalia and urethral meatus appears normal.  Vagina is smooth without nodularity or masses, shortened to 2cm. Vaginal cuff without nodularity, masses, or lesions. Cervix surgically absent. Bimanual exam reveal no masses, nodularity or fullness.  Recto-vaginal exam confirms these findings. Vaginal pap collected.       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 She completed treatment with surgery, EBRT, and brachytherapy on 6/11/14. She is here today for follow up and surveillance.     20 minutes  spent on the date of the encounter doing chart review, history and exam, documentation, and further activities as noted above.        Plan:     1.)        MAGY on exam. Annual pap smear collected today and I will contact the patient with the results. Encouraged annual exam with us or a local provider like her PCP or a local OBGYN. Reviewed signs and symptoms for when she should contact the clinic or seek additional care. Patient to contact the clinic with any questions or concerns in the interim.     2.) Genetic risk factors were assessed and the patient does not meet the qualifications for a referral.      3.) Labs and/or tests ordered include: vaginal pap.     4.) Health maintenance issues addressed today include annual health maintenance and non-gynecologic issues with PCP.        ZULEMA Trinidad, NP-BC  Women's Health Nurse Practitioner  Division of Gynecologic Oncology  [facility]        CC  Patient Care Team:  Michelle Roberts as PCP - General (Family Practice)  Cathy Dickerson APRN CNP as Assigned PCP

## 2022-03-28 ENCOUNTER — ONCOLOGY VISIT (OUTPATIENT)
Dept: ONCOLOGY | Facility: CLINIC | Age: 57
End: 2022-03-28
Attending: OBSTETRICS & GYNECOLOGY
Payer: COMMERCIAL

## 2022-03-28 VITALS
TEMPERATURE: 98.4 F | OXYGEN SATURATION: 99 % | WEIGHT: 261.6 LBS | BODY MASS INDEX: 37.92 KG/M2 | SYSTOLIC BLOOD PRESSURE: 116 MMHG | HEART RATE: 71 BPM | DIASTOLIC BLOOD PRESSURE: 78 MMHG

## 2022-03-28 DIAGNOSIS — Z08 ENCOUNTER FOR FOLLOW-UP SURVEILLANCE OF CERVICAL CANCER: Primary | ICD-10-CM

## 2022-03-28 DIAGNOSIS — Z85.41 ENCOUNTER FOR FOLLOW-UP SURVEILLANCE OF CERVICAL CANCER: Primary | ICD-10-CM

## 2022-03-28 PROCEDURE — G0463 HOSPITAL OUTPT CLINIC VISIT: HCPCS

## 2022-03-28 PROCEDURE — 99213 OFFICE O/P EST LOW 20 MIN: CPT | Performed by: OBSTETRICS & GYNECOLOGY

## 2022-03-28 PROCEDURE — 88175 CYTOPATH C/V AUTO FLUID REDO: CPT | Performed by: OBSTETRICS & GYNECOLOGY

## 2022-03-28 ASSESSMENT — PAIN SCALES - GENERAL: PAINLEVEL: NO PAIN (0)

## 2022-03-28 NOTE — LETTER
3/28/2022         RE: Hanh Corea  [address] [facility] [address]        Dear Colleague,    Thank you for referring your patient, Hanh Corea, to the [facility]. Please see a copy of my visit note below.                   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 She completed treatment with surgery, EBRT, and brachytherapy on 14. She is here today for follow up and surveillance.     Brief Oncology History:  Initially presented for evaluation of increased vaginal discharge and spotting between cycles in 2013. She states that she has had some discharge over the last year. The discharge has been odorous, and she needs to wear a pad throughout the day. Sometimes some bleeding noted with the discharge. Her PCP referred her to OB/GYN, and ordered pelvic US. On 13 US showed borderline thickening of endometrial stripe (1.6 cm) which was heterogenous and contained small cystic spaces, Nabothian cysts noted throughout. Uterus measured 9.2 x 6 x 6.9 cm. Endometrial biopsy on 1/15/14, noted that the cervix was extremely friable. Endometrial biopsy showed adenocarcinoma, endometrioid type, villoglandular variant, FIGO grade 1. On 14, she followed up with gyn, who noted that there was an exophytic friable fungating lesion on anterior portion of pt's cervix. Cervical polyp biopsy was obtained. This returned adenocarcinoma, endocervical vs endometrial. 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   14 CT C/A/P Impression:   1. Mild thickening of the lower uterine segment and cervix most likely corresponds to the patient's known endometrial and cervical adenocarcinoma.  2. No evidence for metastasis in the chest, abdomen pelvis.  3. Incidental short segment small  bowel-small bowel intussusception without evidence for small bowel obstruction. Most of this intussusceptions are transient.  2/11/14 Exploratory Laparotomy, total abdominal hysterectomy, bilateral salpingo-oopharectomy, bilateral pelvic and periaortic lymph node dissection, omental biopsy. The surgery was technically difficult due to morbid obesity with BMI of 40.0. Large amount of subcutaneous and intraperitoneal adiposity   SYNOPTIC SUMMARY:  Specimen: Uterus, cervix, bilateral fallopian tubes and ovaries, pelvic and periaortic lymph nodes.  Procedure: Total abdominal hysterectomy, bilateral salpingo-oophorectomy, pelvic and periaortic lymph node dissection, omentectomy.  Tumor size: 2.5 x 1.5 cm.  Tumor site: Endocervix.  Histologic type: Adenocarcinoma.  Histologic grade: G2, well differentiated.  Stromal invasion: Depth: 4 mm.  Horizontal extent: 25 mm.  Margins: Uninvolved by invasive carcinoma.  Lymph-vascular invasion: Not identified.  Pathologic staging (pTNM):  Primary tumor (pT): pT1b1: Clinically visible lesion less than or equal to 4.0 cm in greatest dimension.  Regional lymph nodes (pN): pN0: No regional lymph node metastasis.  Number of lymph nodes examined: 20.  Number of lymph nodes involved: 0.  Distant metastasis (pM): Not applicable.  Additional pathologic findings: Benign endometrial polyp formation and leiomyoma.  2/26/14: On 2/22, she was hospitalized in [place] for ileus.  6/9/14: Radiation oncology consultation. S/P External beam Therapy in [place] subsequently referred for vaginal cuff boost at [facility]. Her indications for postoperative radiotherapy include adeoncarcinoma subtype as well as the fact that she had a simple hysterectomy. She has had 4500 cGy of EBT which was poorly tolerated. The vaginal cuff is a possible site of recurrence and a cuff boost may decrease the risk of recurrence there. Had she had a radical hysterectomy the upper 1/3 of the vagina is taken and in that case I do  not usually boost with brachy. In this situation vag cuff brachy wasrecommended. Recommended imodium prn diarrhea. Diet modifications help to control stool. Has dilator for use. Anal fissure most irritating; just started steroid cream.   6/11/14: Completed 3 brachytherapy treatments. Mood intermittently depressed but has supportive family. She has recently increased her dose of Zoloft to 100 mg per PCP.   8/27/14: Completed radiation. Diarrhea has resolved. Pain with defecation. No rectal bleeding. History anal fissure by history only, not exam. Uses steroid cream prn. Notes deep hip pain she feels is r/t radiation. No libido and has insertional and deep dyspareunia. Tried lubrication. Has not tried estrogen cream. Trying to use dilator. Continues on Zoloft 100 mg. Has occasional dilator use. Has not had post treatment imaging since completing treatment 7/14.      11/05/14 CT C/A/P impression: 1) Mild circumferential rectal wall thickening and surrounding inflammatory change. Findings are nonspecific. 2) 1.5 cm lytic lesion containing soft tissue and gas in the anterior left scapula is most likely degenerative in etiology and appears nonaggressive. This could be followed with plain film. 3) Resolution of abdominal abscess since prior CT.     01/27/15 3 month surveillance visit for review of CT scan and PAP/Pelvic. Atypical-glandular cells(NOS). Other Non-Neoplastic Findings: Reactive cellular changes associated with radiation.     2/10/15: Colpo with vaginal cuff bx: FINAL DIAGNOSIS: Vagina, apex, biopsy: -Necrosis, ulceration, and changes consistent with radiation      7/21/15: 3 month follow up visit.   10/20/15: 3 month follow up visit. Feeling well. Decrease libido and difficulty with arousal.   6/8/16: Pap NIL, HPV negative  11/11/19: Pap NIL  3/28/2022: Pap pending           She denies any vaginal bleeding, no changes in her bowel or bladder habits, no nausea/emesis, no lower extremity edema, and no  difficulties eating or sleeping. She denies any abdominal discomfort/bloating, no fevers or chills, and no chest pain or shortness of breath. She is not sexually active, not using dilator for the last few months.         Health Maintenance  Colonoscopy- 2/2021, due 5 years  Mammogram- scheduled for this summer   DEXA- due for this   Annual physical- 3/29/2022 scheduled         Review of Systems:    Systemic           no weight changes; no fever; no chills; no night sweats; no appetite changes  Skin           no rashes, or lesions  Eye           no irritation; no changes in vision  Jennifer-Laryngeal           no dysphagia; no hoarseness   Pulmonary    no cough; no shortness of breath  Cardiovascular    no chest pain; no palpitations  Gastrointestinal    no diarrhea; no constipation; no abdominal pain; no changes in bowel  habits; no blood in stool  Genitourinary   no urinary frequency; no urinary urgency; no dysuria; no pain; no abnormal vaginal discharge; no abnormal vaginal bleeding  Breast    no breast discharge; no breast changes; no breast pain  Musculoskeletal    no myalgias; no arthralgias; no back pain  Psychiatric           no depressed mood; no anxiety    Hematologic           no tender lymph nodes; no noticeable swellings or lumps   Endocrine    no hot flashes; no heat/cold intolerance         Neurological   no tremor; no numbness and tingling; no headaches; no difficulty  sleeping      Past Medical History:    Past Medical History:   Diagnosis Date     AV junctional tachycardia (H)     History of junctional tachycardia and SVT with ablation in [place] x 2. Follows with [facility].      Depression      Diabetes (H)      Endocervical adenocarcinoma (H) 2/11/14     Hyperlipidemia      Sleep apnea          Past Surgical History:    Past Surgical History:   Procedure Laterality Date     CARPAL TUNNEL RELEASE RT/LT       CHOLECYSTECTOMY       GYN SURGERY       HYSTERECTOMY TOTAL ABDOMINAL, BILATERAL  SALPINGO-OOPHORECTOMY, NODE DISSECTION, COMBINED  2/11/2014    Procedure: COMBINED HYSTERECTOMY TOTAL ABDOMINAL, SALPINGO-OOPHORECTOMY, NODE DISSECTION;  Exploratory Laparotomy, Total Abdominal Hysterectomy, Bilateral Salpingo-Oophrectomy, Pelvic And Para-Aortic Lymph Node Dissection, Exam Under Anesthesia, Omental Biopsy;  Surgeon: Karen Bella MD;  Location:  OR         Health Maintenance Due   Topic Date Due     PREVENTIVE CARE VISIT  Never done     LIPID  Never done     MICROALBUMIN  Never done     DIABETIC FOOT EXAM  Never done     ADVANCE CARE PLANNING  Never done     EYE EXAM  Never done     Pneumococcal Vaccine: Pediatrics (0 to 5 Years) and At-Risk Patients (6 to 64 Years) (1 of 2 - PPSV23) Never done     HIV SCREENING  Never done     HEPATITIS C SCREENING  Never done     BMP  01/28/2015     HPV TEST  02/08/2020     MAMMO SCREENING  01/09/2021     INFLUENZA VACCINE (1) 09/01/2021     COVID-19 Vaccine (3 - Booster for Pfizer series) 09/06/2021     PHQ-2 (once per calendar year)  Never done     PAP  06/28/2022       Current Medications:     Current Outpatient Medications   Medication Sig Dispense Refill     aspirin 81 MG tablet Take 81 mg by mouth daily        atorvastatin (LIPITOR) 40 MG tablet Take 40 mg by mouth daily       Cholecalciferol (VITAMIN D3) 400 UNITS CAPS Take 400 Units by mouth daily       diltiazem (CARDIZEM) 60 MG tablet Take one tablet every 4 hours as needed for Heart rate above 110 beats per minute.       flecainide (TAMBOCOR) 100 MG tablet Take 100 mg by mouth daily.        insulin glargine (LANTUS SOLOSTAR) SOLN 100 UNIT/ML Inject 60 units SQ at bedtime. 1 Month 1     Krill Oil (MAXIMUM RED KRILL PO) Take 1 tablet by mouth daily       METFORMIN HCL PO Take 1,000 mg by mouth 2 times daily (with meals)       Multiple Vitamin (MULTI-VITAMINS) TABS Take 1 tablet by mouth daily       NOVOLOG FLEXPEN SOLN 100 UNIT/ML Inject 2.5 units/15 gms CHO with meals and snacks plus correction with  meals. 1 Month 4     Ubiquinone (ULTRA COQ10) 75 MG CAPS Take 75 mg by mouth daily       cycloSPORINE (RESTASIS) 0.05 % ophthalmic emulsion 1 drop 2 times daily (Patient not taking: Reported on 3/28/2022)           Allergies:        Allergies   Allergen Reactions     Byetta Itching and Rash     Exenatide Rash and Itching     Morphine Itching     Sulfa Drugs Nausea        Social History:     Social History     Tobacco Use     Smoking status: Never Smoker     Smokeless tobacco: Never Used   Substance Use Topics     Alcohol use: No       History   Drug Use No         Family History:     The patient's family history is notable for     Family History   Problem Relation Age of Onset     Cancer Father         colon cancer     Cancer Paternal Grandfather         prostate  cancer         Physical Exam:     /78   Pulse 71   Temp 98.4  F (36.9  C) (Oral)   Wt 118.7 kg (261 lb 9.6 oz)   LMP 12/23/2013   SpO2 99%   BMI 37.92 kg/m    Body mass index is 37.92 kg/m .    General Appearance: healthy and alert, no distress     HEENT: no thyromegaly, no palpable nodules or masses        Cardiovascular: regular rate and rhythm, no gallops, rubs or murmurs     Respiratory: lungs clear, no rales, rhonchi or wheezes    Musculoskeletal: extremities non tender and without edema    Skin: no lesions or rashes     Neurological: normal gait, no gross defects     Psychiatric: appropriate mood and affect                               Hematological: normal cervical, supraclavicular and inguinal lymph nodes     Gastrointestinal:       abdomen soft, non-tender, non-distended, no organomegaly or masses    Genitourinary: External genitalia and urethral meatus appears normal.  Vagina is smooth without nodularity or masses, shortened to 2cm. Vaginal cuff without nodularity, masses, or lesions. Cervix surgically absent. Bimanual exam reveal no masses, nodularity or fullness.  Recto-vaginal exam confirms these findings. Vaginal pap collected.        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 She completed treatment with surgery, EBRT, and brachytherapy on 6/11/14. She is here today for follow up and surveillance.     20 minutes spent on the date of the encounter doing chart review, history and exam, documentation, and further activities as noted above.        Plan:     1.)        MAGY on exam. Annual pap smear collected today and I will contact the patient with the results. Encouraged annual exam with us or a local provider like her PCP or a local OBGYN. Reviewed signs and symptoms for when she should contact the clinic or seek additional care. Patient to contact the clinic with any questions or concerns in the interim.     2.) Genetic risk factors were assessed and the patient does not meet the qualifications for a referral.      3.) Labs and/or tests ordered include: vaginal pap.     4.) Health maintenance issues addressed today include annual health maintenance and non-gynecologic issues with PCP.        ZULEMA Trinidad, TERESITA-BC  Women's Health Nurse Practitioner  Division of Gynecologic Oncology  [facility]      CC  Patient Care Team:  Micehlle Roberts as PCP - General (Family Practice)  Cathy Dickerson APRN CNP as Assigned PCP

## 2022-03-28 NOTE — NURSING NOTE
"Oncology Rooming Note    March 28, 2022 11:03 AM   Hanh Corea is a 57 year old female who presents for:    Chief Complaint   Patient presents with     Oncology Clinic Visit     rtn for cervical cancer     Initial Vitals: /78   Pulse 71   Temp 98.4  F (36.9  C) (Oral)   Wt 118.7 kg (261 lb 9.6 oz)   LMP 12/23/2013   SpO2 99%   BMI 37.92 kg/m   Estimated body mass index is 37.92 kg/m  as calculated from the following:    Height as of 11/8/19: 1.769 m (5' 9.65\").    Weight as of this encounter: 118.7 kg (261 lb 9.6 oz). Body surface area is 2.42 meters squared.  No Pain (0) Comment: Data Unavailable   Patient's last menstrual period was 12/23/2013.  Allergies reviewed: Yes  Medications reviewed: Yes    Medications: Medication refills not needed today.  Pharmacy name entered into [employer]: [id number] - [place], [place] - [address]    Clinical concerns: none       Shi Samuels, EMT            "

## 2022-03-30 LAB
BKR LAB AP GYN ADEQUACY: NORMAL
BKR LAB AP GYN INTERPRETATION: NORMAL
BKR LAB AP HPV REFLEX: NO
BKR LAB AP PREVIOUS ABNORMAL: NORMAL
PATH REPORT.COMMENTS IMP SPEC: NORMAL
PATH REPORT.COMMENTS IMP SPEC: NORMAL
PATH REPORT.RELEVANT HX SPEC: NORMAL

## 2022-10-06 NOTE — NURSING NOTE
"Hanh Corea is a 52 year old female who presents for:  Chief Complaint   Patient presents with     Oncology Clinic Visit     Cervical/Uterine Ca F/U        Initial Vitals:  /84 (BP Location: Left arm, Patient Position: Chair, Cuff Size: Adult Large)  Pulse 76  Temp 98.1  F (36.7  C) (Oral)  Resp 18  Ht 1.765 m (5' 9.49\")  Wt 127.3 kg (280 lb 11.2 oz)  LMP 12/23/2013  SpO2 97%  BMI 40.87 kg/m2 Estimated body mass index is 40.87 kg/(m^2) as calculated from the following:    Height as of this encounter: 1.765 m (5' 9.49\").    Weight as of this encounter: 127.3 kg (280 lb 11.2 oz).. Body surface area is 2.5 meters squared. BP completed using cuff size: large  No Pain (1) Patient's last menstrual period was 12/23/2013. Allergies and medications reviewed.     Medications: Medication refills not needed today.  Pharmacy name entered into [employer]: [facility] [address] [place] [address] - [address] [place] [address]    Comments:     7 minutes for nursing intake (face to face time)   Guerline Robertson LPN        "
Never

## 2022-10-16 ENCOUNTER — HEALTH MAINTENANCE LETTER (OUTPATIENT)
Age: 57
End: 2022-10-16

## 2022-12-04 ENCOUNTER — HEALTH MAINTENANCE LETTER (OUTPATIENT)
Age: 57
End: 2022-12-04

## 2023-03-08 NOTE — PROGRESS NOTES
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 She completed treatment with surgery, EBRT, and brachytherapy on 14. She is here today for follow up and surveillance.     Brief Oncology History:  Initially presented for evaluation of increased vaginal discharge and spotting between cycles in 2013. She states that she has had some discharge over the last year. The discharge has been odorous, and she needs to wear a pad throughout the day. Sometimes some bleeding noted with the discharge. Her PCP referred her to OB/GYN, and ordered pelvic US. On 13 US showed borderline thickening of endometrial stripe (1.6 cm) which was heterogenous and contained small cystic spaces, Nabothian cysts noted throughout. Uterus measured 9.2 x 6 x 6.9 cm. Endometrial biopsy on 1/15/14, noted that the cervix was extremely friable. Endometrial biopsy showed adenocarcinoma, endometrioid type, villoglandular variant, FIGO grade 1. On 14, she followed up with gyn, who noted that there was an exophytic friable fungating lesion on anterior portion of pt's cervix. Cervical polyp biopsy was obtained. This returned adenocarcinoma, endocervical vs endometrial. 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   14 CT C/A/P Impression:   1. Mild thickening of the lower uterine segment and cervix most likely corresponds to the patient's known endometrial and cervical adenocarcinoma.  2. No evidence for metastasis in the chest, abdomen pelvis.  3. Incidental short segment small bowel-small bowel intussusception without evidence for small bowel obstruction. Most of this intussusceptions are transient.  14 Exploratory Laparotomy, total abdominal hysterectomy, bilateral salpingo-oopharectomy, bilateral pelvic and periaortic lymph  node dissection, omental biopsy. The surgery was technically difficult due to morbid obesity with BMI of 40.0. Large amount of subcutaneous and intraperitoneal adiposity   SYNOPTIC SUMMARY:  Specimen: Uterus, cervix, bilateral fallopian tubes and ovaries, pelvic and periaortic lymph nodes.  Procedure: Total abdominal hysterectomy, bilateral salpingo-oophorectomy, pelvic and periaortic lymph node dissection, omentectomy.  Tumor size: 2.5 x 1.5 cm.  Tumor site: Endocervix.  Histologic type: Adenocarcinoma.  Histologic grade: G2, well differentiated.  Stromal invasion: Depth: 4 mm.  Horizontal extent: 25 mm.  Margins: Uninvolved by invasive carcinoma.  Lymph-vascular invasion: Not identified.  Pathologic staging (pTNM):  Primary tumor (pT): pT1b1: Clinically visible lesion less than or equal to 4.0 cm in greatest dimension.  Regional lymph nodes (pN): pN0: No regional lymph node metastasis.  Number of lymph nodes examined: 20.  Number of lymph nodes involved: 0.  Distant metastasis (pM): Not applicable.  Additional pathologic findings: Benign endometrial polyp formation and leiomyoma.  2/26/14: On 2/22, she was hospitalized in [place] for ileus.  6/9/14: Radiation oncology consultation. S/P External beam Therapy in [place] subsequently referred for vaginal cuff boost at [facility]. Her indications for postoperative radiotherapy include adeoncarcinoma subtype as well as the fact that she had a simple hysterectomy. She has had 4500 cGy of EBT which was poorly tolerated. The vaginal cuff is a possible site of recurrence and a cuff boost may decrease the risk of recurrence there. Had she had a radical hysterectomy the upper 1/3 of the vagina is taken and in that case I do not usually boost with brachy. In this situation vag cuff brachy wasrecommended. Recommended imodium prn diarrhea. Diet modifications help to control stool. Has dilator for use. Anal fissure most irritating; just started steroid cream.   6/11/14: Completed 3  brachytherapy treatments. Mood intermittently depressed but has supportive family. She has recently increased her dose of Zoloft to 100 mg per PCP.   8/27/14: Completed radiation. Diarrhea has resolved. Pain with defecation. No rectal bleeding. History anal fissure by history only, not exam. Uses steroid cream prn. Notes deep hip pain she feels is r/t radiation. No libido and has insertional and deep dyspareunia. Tried lubrication. Has not tried estrogen cream. Trying to use dilator. Continues on Zoloft 100 mg. Has occasional dilator use. Has not had post treatment imaging since completing treatment 7/14.      11/05/14 CT C/A/P impression: 1) Mild circumferential rectal wall thickening and surrounding inflammatory change. Findings are nonspecific. 2) 1.5 cm lytic lesion containing soft tissue and gas in the anterior left scapula is most likely degenerative in etiology and appears nonaggressive. This could be followed with plain film. 3) Resolution of abdominal abscess since prior CT.     01/27/15 3 month surveillance visit for review of CT scan and PAP/Pelvic. Atypical-glandular cells(NOS). Other Non-Neoplastic Findings: Reactive cellular changes associated with radiation.     2/10/15: Colpo with vaginal cuff bx: FINAL DIAGNOSIS: Vagina, apex, biopsy: -Necrosis, ulceration, and changes consistent with radiation      7/21/15: 3 month follow up visit.   10/20/15: 3 month follow up visit. Feeling well. Decrease libido and difficulty with arousal.   6/8/16: Pap NIL, HPV negative  11/11/19: Pap NIL  3/28/2022: Pap NIL  3/9/2023: pap pending          Weight loss intentional with Ozempic with Endo. Pt having decreased appeite and eating smaller meals. Started Ozempic 1/2023.Having constipation with Ozempic not well controlled with stool softener and fiber. Reports that her stools are not firm but she still is having a hard time passing them. Has increased water and fiber. Taking Colace and Miralax without much improvement.      She denies any vaginal bleeding, no changes in her bowel or bladder habits, no nausea/emesis, no lower extremity edema, and no difficulties eating or sleeping. She denies any abdominal discomfort/bloating, no fevers or chills, and no chest pain or shortness of breath. She is not sexually active, not using dilator.        Health Maintenance  Colonoscopy- 2/2021, due 5 years  Mammogram- 12/2022 negative   DEXA- 12/2022  Annual physical- 12/2022        Review of Systems:    Systemic           no weight changes; no fever; no chills; no night sweats; no appetite changes  Skin           no rashes, or lesions  Eye           no irritation; no changes in vision  Jennifer-Laryngeal           no dysphagia; no hoarseness   Pulmonary    no cough; no shortness of breath  Cardiovascular    no chest pain; no palpitations  Gastrointestinal    no diarrhea; + constipation; no abdominal pain; no changes in bowel  habits; no blood in stool  Genitourinary   no urinary frequency; no urinary urgency; no dysuria; no pain; no abnormal vaginal discharge; no abnormal vaginal bleeding  Breast    no breast discharge; no breast changes; no breast pain  Musculoskeletal    no myalgias; no arthralgias; no back pain  Psychiatric           no depressed mood; no anxiety    Hematologic           no tender lymph nodes; no noticeable swellings or lumps   Endocrine    no hot flashes; no heat/cold intolerance         Neurological   no tremor; no numbness and tingling; no headaches; no difficulty  sleeping      Past Medical History:    Past Medical History:   Diagnosis Date     AV junctional tachycardia (H)     History of junctional tachycardia and SVT with ablation in [place] x 2. Follows with [facility].      Depression      Diabetes (H)      Endocervical adenocarcinoma (H) 2/11/14     Hyperlipidemia      Sleep apnea          Past Surgical History:    Past Surgical History:   Procedure Laterality Date     CARPAL TUNNEL RELEASE RT/LT        CHOLECYSTECTOMY       GYN SURGERY       HYSTERECTOMY TOTAL ABDOMINAL, BILATERAL SALPINGO-OOPHORECTOMY, NODE DISSECTION, COMBINED  2/11/2014    Procedure: COMBINED HYSTERECTOMY TOTAL ABDOMINAL, SALPINGO-OOPHORECTOMY, NODE DISSECTION;  Exploratory Laparotomy, Total Abdominal Hysterectomy, Bilateral Salpingo-Oophrectomy, Pelvic And Para-Aortic Lymph Node Dissection, Exam Under Anesthesia, Omental Biopsy;  Surgeon: Karen Bella MD;  Location: [facility] OR         Health Maintenance Due   Topic Date Due     LIPID  Never done     MICROALBUMIN  Never done     DIABETIC FOOT EXAM  Never done     ADVANCE CARE PLANNING  Never done     EYE EXAM  Never done     Pneumococcal Vaccine: Pediatrics (0 to 5 Years) and At-Risk Patients (6 to 64 Years) (1 - PCV) Never done     COLORECTAL CANCER SCREENING  Never done     HIV SCREENING  Never done     HEPATITIS C SCREENING  Never done     BMP  01/28/2015     COVID-19 Vaccine (3 - Booster for Pfizer series) 06/01/2021     HPV TEST  06/28/2022     PAP  06/28/2022     A1C  08/17/2022     INFLUENZA VACCINE (1) 09/01/2022     YEARLY PREVENTIVE VISIT  09/17/2022     PHQ-2 (once per calendar year)  Never done       Current Medications:     Current Outpatient Medications   Medication Sig Dispense Refill     aspirin 81 MG tablet Take 81 mg by mouth daily       atorvastatin (LIPITOR) 40 MG tablet Take 40 mg by mouth daily       Cholecalciferol (VITAMIN D3) 400 UNITS CAPS Take 400 Units by mouth daily       diltiazem (CARDIZEM) 60 MG tablet Take one tablet every 4 hours as needed for Heart rate above 110 beats per minute.       flecainide (TAMBOCOR) 100 MG tablet Take 100 mg by mouth daily.        lisinopril (ZESTRIL) 10 MG tablet        Magnesium 400 MG CAPS Take 400 mg by mouth       metFORMIN (GLUCOPHAGE) 1000 MG tablet        Multiple Vitamin (MULTI-VITAMINS) TABS Take 1 tablet by mouth daily       OZEMPIC, 1 MG/DOSE, 4 MG/3ML pen        SENNA-docusate sodium (SENNA S) 8.6-50 MG tablet Take 1  tablet by mouth At Bedtime Max dose 4 tablets twice per day 1 tablet 3     insulin glargine (LANTUS VIAL) 100 UNIT/ML vial Inject 60 units SQ at bedtime. (Patient not taking: Reported on 3/9/2023) 1 Month 1     Krill Oil (MAXIMUM RED KRILL PO) Take 1 tablet by mouth daily (Patient not taking: Reported on 3/9/2023)       NOVOLOG FLEXPEN SOLN 100 UNIT/ML Inject 2.5 units/15 gms CHO with meals and snacks plus correction with meals. (Patient not taking: Reported on 3/9/2023) 1 Month 4     Ubiquinone 75 MG CAPS Take 75 mg by mouth daily (Patient not taking: Reported on 3/9/2023)           Allergies:        Allergies   Allergen Reactions     Byetta Itching and Rash     Exenatide Rash and Itching     Morphine Itching     Sulfa Drugs Nausea        Social History:     Social History     Tobacco Use     Smoking status: Never     Smokeless tobacco: Never   Substance Use Topics     Alcohol use: No       History   Drug Use No         Family History:     The patient's family history is notable for     Family History   Problem Relation Age of Onset     Cancer Father         colon cancer     Cancer Paternal Grandfather         prostate  cancer         Physical Exam:     BP (!) 140/87   Pulse 82   Temp 98.7  F (37.1  C) (Oral)   Resp 16   Wt 108 kg (238 lb)   LMP 12/23/2013   SpO2 98%   BMI 34.50 kg/m    Body mass index is 34.5 kg/m .    General Appearance: healthy and alert, no distress     HEENT: no thyromegaly, no palpable nodules or masses        Cardiovascular: regular rate and rhythm, no gallops, rubs or murmurs     Respiratory: lungs clear, no rales, rhonchi or wheezes    Musculoskeletal: extremities non tender and without edema    Skin: no lesions or rashes     Neurological: normal gait, no gross defects     Psychiatric: appropriate mood and affect                               Hematological: normal cervical, supraclavicular and inguinal lymph nodes     Gastrointestinal:       abdomen soft, non-tender, non-distended,  no organomegaly or masses    Genitourinary: External genitalia and urethral meatus appears normal.  Vagina is smooth without nodularity or masses, shortened to 2-3cm. Vaginal cuff without nodularity, masses, or lesions. Cervix surgically absent. Bimanual exam reveal no masses, nodularity or fullness.  Recto-vaginal exam confirms these findings. Rectal external hemorrhoid small and soft. Difficulty completing rectal digital exam due to narrowing and discomfort for patient. Narrowing of rectum and possible internal hemorrhoids on exam.  Vaginal pap collected.       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 She completed treatment with surgery, EBRT, and brachytherapy on 6/11/14. She is here today for follow up and surveillance.     30 minutes spent on the date of the encounter doing chart review, history and exam, documentation, and further activities as noted above.        Plan:     1.)        MAGY on exam however significant increase in discomfort with rectal exam. Narrowing of rectum on exam. Possibly related to radiation hx however pt having difficulty passing stools (stool are not hard). Last CT 2014. Given cervical cancer hx, will get a CT to assess. Annual pap smear collected today and I will contact the patient with the results. Pt will start Senna S for constipation and continue increased water intake and fiber/Miralax. She will follow up with her PCP in one month if not improving for a possible early colonoscopy. Rx for Senna and how to titrate this reviewed and sent. Reviewed signs and symptoms for when she should contact the clinic or seek additional care. Patient to contact the clinic with any questions or concerns in the interim. Encouraged weekly dilator use to prevent any further shortening of vagina.      2.) Genetic risk factors were assessed and the patient does not meet the qualifications for a referral.      3.) Labs  and/or tests ordered include: vaginal pap, CT CAP.     CT orders faxed to [facility]. Pt aware to call radiology dept there to schedule appt within 1 week.      4.) Health maintenance issues addressed today include annual health maintenance and non-gynecologic issues with PCP.        ZULEMA Trinidad, TERESITA-BC  Women's Health Nurse Practitioner  Division of Gynecologic Oncology  [facility]        CC  Patient Care Team:  Michelle Roberts as PCP - General (Family Practice)  Jeanie Nina APRN CNP as Assigned Cancer Care Provider

## 2023-03-09 ENCOUNTER — ONCOLOGY VISIT (OUTPATIENT)
Dept: ONCOLOGY | Facility: CLINIC | Age: 58
End: 2023-03-09
Attending: OBSTETRICS & GYNECOLOGY
Payer: COMMERCIAL

## 2023-03-09 VITALS
BODY MASS INDEX: 34.5 KG/M2 | RESPIRATION RATE: 16 BRPM | TEMPERATURE: 98.7 F | DIASTOLIC BLOOD PRESSURE: 87 MMHG | SYSTOLIC BLOOD PRESSURE: 140 MMHG | WEIGHT: 238 LBS | OXYGEN SATURATION: 98 % | HEART RATE: 82 BPM

## 2023-03-09 DIAGNOSIS — K62.89 RECTAL PAIN: ICD-10-CM

## 2023-03-09 DIAGNOSIS — Z08 ENCOUNTER FOR FOLLOW-UP SURVEILLANCE OF CERVICAL CANCER: Primary | ICD-10-CM

## 2023-03-09 DIAGNOSIS — Z85.41 ENCOUNTER FOR FOLLOW-UP SURVEILLANCE OF CERVICAL CANCER: Primary | ICD-10-CM

## 2023-03-09 DIAGNOSIS — K59.09 OTHER CONSTIPATION: ICD-10-CM

## 2023-03-09 PROCEDURE — 88175 CYTOPATH C/V AUTO FLUID REDO: CPT | Performed by: OBSTETRICS & GYNECOLOGY

## 2023-03-09 PROCEDURE — 99213 OFFICE O/P EST LOW 20 MIN: CPT | Performed by: OBSTETRICS & GYNECOLOGY

## 2023-03-09 PROCEDURE — 99214 OFFICE O/P EST MOD 30 MIN: CPT | Performed by: OBSTETRICS & GYNECOLOGY

## 2023-03-09 RX ORDER — SENNA AND DOCUSATE SODIUM 50; 8.6 MG/1; MG/1
1 TABLET, FILM COATED ORAL AT BEDTIME
Qty: 1 TABLET | Refills: 3 | Status: SHIPPED | OUTPATIENT
Start: 2023-03-09

## 2023-03-09 RX ORDER — SEMAGLUTIDE 1.34 MG/ML
INJECTION, SOLUTION SUBCUTANEOUS
COMMUNITY
Start: 2022-12-19

## 2023-03-09 RX ORDER — LISINOPRIL 10 MG/1
TABLET ORAL
COMMUNITY
Start: 2023-02-23

## 2023-03-09 ASSESSMENT — PAIN SCALES - GENERAL: PAINLEVEL: NO PAIN (0)

## 2023-03-09 NOTE — LETTER
3/9/2023         RE: Hanh Corea  [address] [facility] [id number]        Dear Colleague,    Thank you for referring your patient, Hanh Corea, to the [facility]. Please see a copy of my visit note below.                   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 She completed treatment with surgery, EBRT, and brachytherapy on 14. She is here today for follow up and surveillance.     Brief Oncology History:  Initially presented for evaluation of increased vaginal discharge and spotting between cycles in 2013. She states that she has had some discharge over the last year. The discharge has been odorous, and she needs to wear a pad throughout the day. Sometimes some bleeding noted with the discharge. Her PCP referred her to OB/GYN, and ordered pelvic US. On 13 US showed borderline thickening of endometrial stripe (1.6 cm) which was heterogenous and contained small cystic spaces, Nabothian cysts noted throughout. Uterus measured 9.2 x 6 x 6.9 cm. Endometrial biopsy on 1/15/14, noted that the cervix was extremely friable. Endometrial biopsy showed adenocarcinoma, endometrioid type, villoglandular variant, FIGO grade 1. On 14, she followed up with gyn, who noted that there was an exophytic friable fungating lesion on anterior portion of pt's cervix. Cervical polyp biopsy was obtained. This returned adenocarcinoma, endocervical vs endometrial. 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   14 CT C/A/P Impression:   1. Mild thickening of the lower uterine segment and cervix most likely corresponds to the patient's known endometrial and cervical adenocarcinoma.  2. No evidence for metastasis in the chest, abdomen pelvis.  3. Incidental short segment small bowel-small  bowel intussusception without evidence for small bowel obstruction. Most of this intussusceptions are transient.  2/11/14 Exploratory Laparotomy, total abdominal hysterectomy, bilateral salpingo-oopharectomy, bilateral pelvic and periaortic lymph node dissection, omental biopsy. The surgery was technically difficult due to morbid obesity with BMI of 40.0. Large amount of subcutaneous and intraperitoneal adiposity   SYNOPTIC SUMMARY:  Specimen: Uterus, cervix, bilateral fallopian tubes and ovaries, pelvic and periaortic lymph nodes.  Procedure: Total abdominal hysterectomy, bilateral salpingo-oophorectomy, pelvic and periaortic lymph node dissection, omentectomy.  Tumor size: 2.5 x 1.5 cm.  Tumor site: Endocervix.  Histologic type: Adenocarcinoma.  Histologic grade: G2, well differentiated.  Stromal invasion: Depth: 4 mm.  Horizontal extent: 25 mm.  Margins: Uninvolved by invasive carcinoma.  Lymph-vascular invasion: Not identified.  Pathologic staging (pTNM):  Primary tumor (pT): pT1b1: Clinically visible lesion less than or equal to 4.0 cm in greatest dimension.  Regional lymph nodes (pN): pN0: No regional lymph node metastasis.  Number of lymph nodes examined: 20.  Number of lymph nodes involved: 0.  Distant metastasis (pM): Not applicable.  Additional pathologic findings: Benign endometrial polyp formation and leiomyoma.  2/26/14: On 2/22, she was hospitalized in [place] for ileus.  6/9/14: Radiation oncology consultation. S/P External beam Therapy in [place] subsequently referred for vaginal cuff boost at [facility]. Her indications for postoperative radiotherapy include adeoncarcinoma subtype as well as the fact that she had a simple hysterectomy. She has had 4500 cGy of EBT which was poorly tolerated. The vaginal cuff is a possible site of recurrence and a cuff boost may decrease the risk of recurrence there. Had she had a radical hysterectomy the upper 1/3 of the vagina is taken and in that case I do not usually  boost with brachy. In this situation vag cuff brachy wasrecommended. Recommended imodium prn diarrhea. Diet modifications help to control stool. Has dilator for use. Anal fissure most irritating; just started steroid cream.   6/11/14: Completed 3 brachytherapy treatments. Mood intermittently depressed but has supportive family. She has recently increased her dose of Zoloft to 100 mg per PCP.   8/27/14: Completed radiation. Diarrhea has resolved. Pain with defecation. No rectal bleeding. History anal fissure by history only, not exam. Uses steroid cream prn. Notes deep hip pain she feels is r/t radiation. No libido and has insertional and deep dyspareunia. Tried lubrication. Has not tried estrogen cream. Trying to use dilator. Continues on Zoloft 100 mg. Has occasional dilator use. Has not had post treatment imaging since completing treatment 7/14.      11/05/14 CT C/A/P impression: 1) Mild circumferential rectal wall thickening and surrounding inflammatory change. Findings are nonspecific. 2) 1.5 cm lytic lesion containing soft tissue and gas in the anterior left scapula is most likely degenerative in etiology and appears nonaggressive. This could be followed with plain film. 3) Resolution of abdominal abscess since prior CT.     01/27/15 3 month surveillance visit for review of CT scan and PAP/Pelvic. Atypical-glandular cells(NOS). Other Non-Neoplastic Findings: Reactive cellular changes associated with radiation.     2/10/15: Colpo with vaginal cuff bx: FINAL DIAGNOSIS: Vagina, apex, biopsy: -Necrosis, ulceration, and changes consistent with radiation      7/21/15: 3 month follow up visit.   10/20/15: 3 month follow up visit. Feeling well. Decrease libido and difficulty with arousal.   6/8/16: Pap NIL, HPV negative  11/11/19: Pap NIL  3/28/2022: Pap NIL  3/9/2023: pap pending          Weight loss intentional with Ozempic with Endo. Pt having decreased appeite and eating smaller meals. Started Ozempic 1/2023.Having  constipation with Ozempic not well controlled with stool softener and fiber. Reports that her stools are not firm but she still is having a hard time passing them. Has increased water and fiber. Taking Colace and Miralax without much improvement.     She denies any vaginal bleeding, no changes in her bowel or bladder habits, no nausea/emesis, no lower extremity edema, and no difficulties eating or sleeping. She denies any abdominal discomfort/bloating, no fevers or chills, and no chest pain or shortness of breath. She is not sexually active, not using dilator.        Health Maintenance  Colonoscopy- 2/2021, due 5 years  Mammogram- 12/2022 negative   DEXA- 12/2022  Annual physical- 12/2022        Review of Systems:    Systemic           no weight changes; no fever; no chills; no night sweats; no appetite changes  Skin           no rashes, or lesions  Eye           no irritation; no changes in vision  Jennifer-Laryngeal           no dysphagia; no hoarseness   Pulmonary    no cough; no shortness of breath  Cardiovascular    no chest pain; no palpitations  Gastrointestinal    no diarrhea; + constipation; no abdominal pain; no changes in bowel  habits; no blood in stool  Genitourinary   no urinary frequency; no urinary urgency; no dysuria; no pain; no abnormal vaginal discharge; no abnormal vaginal bleeding  Breast    no breast discharge; no breast changes; no breast pain  Musculoskeletal    no myalgias; no arthralgias; no back pain  Psychiatric           no depressed mood; no anxiety    Hematologic           no tender lymph nodes; no noticeable swellings or lumps   Endocrine    no hot flashes; no heat/cold intolerance         Neurological   no tremor; no numbness and tingling; no headaches; no difficulty  sleeping      Past Medical History:    Past Medical History:   Diagnosis Date     AV junctional tachycardia (H)     History of junctional tachycardia and SVT with ablation in [place] x 2. Follows with [facility]       Depression      Diabetes (H)      Endocervical adenocarcinoma (H) 2/11/14     Hyperlipidemia      Sleep apnea          Past Surgical History:    Past Surgical History:   Procedure Laterality Date     CARPAL TUNNEL RELEASE RT/LT       CHOLECYSTECTOMY       GYN SURGERY       HYSTERECTOMY TOTAL ABDOMINAL, BILATERAL SALPINGO-OOPHORECTOMY, NODE DISSECTION, COMBINED  2/11/2014    Procedure: COMBINED HYSTERECTOMY TOTAL ABDOMINAL, SALPINGO-OOPHORECTOMY, NODE DISSECTION;  Exploratory Laparotomy, Total Abdominal Hysterectomy, Bilateral Salpingo-Oophrectomy, Pelvic And Para-Aortic Lymph Node Dissection, Exam Under Anesthesia, Omental Biopsy;  Surgeon: Karen Bella MD;  Location:  OR         Health Maintenance Due   Topic Date Due     LIPID  Never done     MICROALBUMIN  Never done     DIABETIC FOOT EXAM  Never done     ADVANCE CARE PLANNING  Never done     EYE EXAM  Never done     Pneumococcal Vaccine: Pediatrics (0 to 5 Years) and At-Risk Patients (6 to 64 Years) (1 - PCV) Never done     COLORECTAL CANCER SCREENING  Never done     HIV SCREENING  Never done     HEPATITIS C SCREENING  Never done     BMP  01/28/2015     COVID-19 Vaccine (3 - Booster for Pfizer series) 06/01/2021     HPV TEST  06/28/2022     PAP  06/28/2022     A1C  08/17/2022     INFLUENZA VACCINE (1) 09/01/2022     YEARLY PREVENTIVE VISIT  09/17/2022     PHQ-2 (once per calendar year)  Never done       Current Medications:     Current Outpatient Medications   Medication Sig Dispense Refill     aspirin 81 MG tablet Take 81 mg by mouth daily       atorvastatin (LIPITOR) 40 MG tablet Take 40 mg by mouth daily       Cholecalciferol (VITAMIN D3) 400 UNITS CAPS Take 400 Units by mouth daily       diltiazem (CARDIZEM) 60 MG tablet Take one tablet every 4 hours as needed for Heart rate above 110 beats per minute.       flecainide (TAMBOCOR) 100 MG tablet Take 100 mg by mouth daily.        lisinopril (ZESTRIL) 10 MG tablet        Magnesium 400 MG CAPS Take 400  mg by mouth       metFORMIN (GLUCOPHAGE) 1000 MG tablet        Multiple Vitamin (MULTI-VITAMINS) TABS Take 1 tablet by mouth daily       OZEMPIC, 1 MG/DOSE, 4 MG/3ML pen        SENNA-docusate sodium (SENNA S) 8.6-50 MG tablet Take 1 tablet by mouth At Bedtime Max dose 4 tablets twice per day 1 tablet 3     insulin glargine (LANTUS VIAL) 100 UNIT/ML vial Inject 60 units SQ at bedtime. (Patient not taking: Reported on 3/9/2023) 1 Month 1     Krill Oil (MAXIMUM RED KRILL PO) Take 1 tablet by mouth daily (Patient not taking: Reported on 3/9/2023)       NOVOLOG FLEXPEN SOLN 100 UNIT/ML Inject 2.5 units/15 gms CHO with meals and snacks plus correction with meals. (Patient not taking: Reported on 3/9/2023) 1 Month 4     Ubiquinone 75 MG CAPS Take 75 mg by mouth daily (Patient not taking: Reported on 3/9/2023)           Allergies:        Allergies   Allergen Reactions     Byetta Itching and Rash     Exenatide Rash and Itching     Morphine Itching     Sulfa Drugs Nausea        Social History:     Social History     Tobacco Use     Smoking status: Never     Smokeless tobacco: Never   Substance Use Topics     Alcohol use: No       History   Drug Use No         Family History:     The patient's family history is notable for     Family History   Problem Relation Age of Onset     Cancer Father         colon cancer     Cancer Paternal Grandfather         prostate  cancer         Physical Exam:     BP (!) 140/87   Pulse 82   Temp 98.7  F (37.1  C) (Oral)   Resp 16   Wt 108 kg (238 lb)   LMP 12/23/2013   SpO2 98%   BMI 34.50 kg/m    Body mass index is 34.5 kg/m .    General Appearance: healthy and alert, no distress     HEENT: no thyromegaly, no palpable nodules or masses        Cardiovascular: regular rate and rhythm, no gallops, rubs or murmurs     Respiratory: lungs clear, no rales, rhonchi or wheezes    Musculoskeletal: extremities non tender and without edema    Skin: no lesions or rashes     Neurological: normal gait,  no gross defects     Psychiatric: appropriate mood and affect                               Hematological: normal cervical, supraclavicular and inguinal lymph nodes     Gastrointestinal:       abdomen soft, non-tender, non-distended, no organomegaly or masses    Genitourinary: External genitalia and urethral meatus appears normal.  Vagina is smooth without nodularity or masses, shortened to 2-3cm. Vaginal cuff without nodularity, masses, or lesions. Cervix surgically absent. Bimanual exam reveal no masses, nodularity or fullness.  Recto-vaginal exam confirms these findings. Rectal external hemorrhoid small and soft. Difficulty completing rectal digital exam due to narrowing and discomfort for patient. Narrowing of rectum and possible internal hemorrhoids on exam.  Vaginal pap collected.       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 She completed treatment with surgery, EBRT, and brachytherapy on 6/11/14. She is here today for follow up and surveillance.     30 minutes spent on the date of the encounter doing chart review, history and exam, documentation, and further activities as noted above.        Plan:     1.)        MAGY on exam however significant increase in discomfort with rectal exam. Narrowing of rectum on exam. Possibly related to radiation hx however pt having difficulty passing stools (stool are not hard). Last CT 2014. Given cervical cancer hx, will get a CT to assess. Annual pap smear collected today and I will contact the patient with the results. Pt will start Senna S for constipation and continue increased water intake and fiber/Miralax. She will follow up with her PCP in one month if not improving for a possible early colonoscopy. Rx for Senna and how to titrate this reviewed and sent. Reviewed signs and symptoms for when she should contact the clinic or seek additional care. Patient to contact the clinic with any  questions or concerns in the interim. Encouraged weekly dilator use to prevent any further shortening of vagina.      2.) Genetic risk factors were assessed and the patient does not meet the qualifications for a referral.      3.) Labs and/or tests ordered include: vaginal pap, CT CAP.     CT orders faxed to [facility]. Pt aware to call radiology dept there to schedule appt within 1 week.      4.) Health maintenance issues addressed today include annual health maintenance and non-gynecologic issues with PCP.        ZULEMA Trinidad, NP-BC  Women's Health Nurse Practitioner  Division of Gynecologic Oncology  [facility]    CC  Patient Care Team:  Michelle Roberts as PCP - General (Family Practice)

## 2023-03-09 NOTE — NURSING NOTE
"Oncology Rooming Note    March 9, 2023 10:23 AM   Hanh Corea is a 58 year old female who presents for:    Chief Complaint   Patient presents with     Oncology Clinic Visit     Surveillance of cervical cancer     Initial Vitals: BP (!) 140/87   Pulse 82   Temp 98.7  F (37.1  C) (Oral)   Resp 16   Wt 108 kg (238 lb)   LMP 12/23/2013   SpO2 98%   BMI 34.50 kg/m   Estimated body mass index is 34.5 kg/m  as calculated from the following:    Height as of 11/8/19: 1.769 m (5' 9.65\").    Weight as of this encounter: 108 kg (238 lb). Body surface area is 2.3 meters squared.  No Pain (0) Comment: Data Unavailable   Patient's last menstrual period was 12/23/2013.  Allergies reviewed: Yes  Medications reviewed: Yes    Medications: Medication refills not needed today.  Pharmacy name entered into EPIC: [facility] - [place], [place] - [address] [facility] [address]    Clinical concerns: none       Portia James CMA            "

## 2023-03-20 ENCOUNTER — MYC MEDICAL ADVICE (OUTPATIENT)
Dept: ONCOLOGY | Facility: CLINIC | Age: 58
End: 2023-03-20
Payer: COMMERCIAL

## 2023-06-01 ENCOUNTER — HEALTH MAINTENANCE LETTER (OUTPATIENT)
Age: 58
End: 2023-06-01

## 2024-01-13 ENCOUNTER — HEALTH MAINTENANCE LETTER (OUTPATIENT)
Age: 59
End: 2024-01-13

## 2024-04-02 NOTE — PROGRESS NOTES
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 She completed treatment with surgery, EBRT, and brachytherapy on 14.   She is here today for a surveillance visit.     Oncology history:   Initially presented for evaluation of increased vaginal discharge and spotting between cycles in 2013. She states that she has had some discharge over the last year. The discharge has been odorous, and she needs to wear a pad throughout the day. Sometimes some bleeding noted with the discharge. Her PCP referred her to OB/GYN, and ordered pelvic US. On 13 US showed borderline thickening of endometrial stripe (1.6 cm) which was heterogenous and contained small cystic spaces, Nabothian cysts noted throughout. Uterus measured 9.2 x 6 x 6.9 cm. Endometrial biopsy on 1/15/14, noted that the cervix was extremely friable. Endometrial biopsy showed adenocarcinoma, endometrioid type, villoglandular variant, FIGO grade 1. On 14, she followed up with gyn, who noted that there was an exophytic friable fungating lesion on anterior portion of pt's cervix. Cervical polyp biopsy was obtained. This returned adenocarcinoma, endocervical vs endometrial. 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   14 CT C/A/P Impression:   1. Mild thickening of the lower uterine segment and cervix most likely corresponds to the patient's known endometrial and cervical adenocarcinoma.  2. No evidence for metastasis in the chest, abdomen pelvis.  3. Incidental short segment small bowel-small bowel intussusception without evidence for small bowel obstruction. Most of this intussusceptions are transient.  14 Exploratory Laparotomy, total abdominal hysterectomy, bilateral salpingo-oopharectomy, bilateral pelvic and periaortic lymph node  dissection, omental biopsy. The surgery was technically difficult due to morbid obesity with BMI of 40.0. Large amount of subcutaneous and intraperitoneal adiposity   SYNOPTIC SUMMARY:  Specimen: Uterus, cervix, bilateral fallopian tubes and ovaries, pelvic and periaortic lymph nodes.  Procedure: Total abdominal hysterectomy, bilateral salpingo-oophorectomy, pelvic and periaortic lymph node dissection, omentectomy.  Tumor size: 2.5 x 1.5 cm.  Tumor site: Endocervix.  Histologic type: Adenocarcinoma.  Histologic grade: G2, well differentiated.  Stromal invasion: Depth: 4 mm.  Horizontal extent: 25 mm.  Margins: Uninvolved by invasive carcinoma.  Lymph-vascular invasion: Not identified.  Pathologic staging (pTNM):  Primary tumor (pT): pT1b1: Clinically visible lesion less than or equal to 4.0 cm in greatest dimension.  Regional lymph nodes (pN): pN0: No regional lymph node metastasis.  Number of lymph nodes examined: 20.  Number of lymph nodes involved: 0.  Distant metastasis (pM): Not applicable.  Additional pathologic findings: Benign endometrial polyp formation and leiomyoma.  2/26/14: On 2/22, she was hospitalized in [place] for ileus.    6/9/14: Radiation oncology consultation. S/P External beam Therapy in [place] subsequently referred for vaginal cuff boost at [facility]. Her indications for postoperative radiotherapy include adeoncarcinoma subtype as well as the fact that she had a simple hysterectomy. She has had 4500 cGy of EBT which was poorly tolerated. The vaginal cuff is a possible site of recurrence and a cuff boost may decrease the risk of recurrence there. Had she had a radical hysterectomy the upper 1/3 of the vagina is taken and in that case I do not usually boost with brachy. In this situation vag cuff brachy wasrecommended. Recommended imodium prn diarrhea. Diet modifications help to control stool. Has dilator for use. Anal fissure most irritating; just started steroid cream.     6/11/14: Completed 3  "brachytherapy treatments. Mood intermittently depressed but has supportive family. She has recently increased her dose of Zoloft to 100 mg per PCP.   8/27/14: Completed radiation. Diarrhea has resolved. Pain with defecation. No rectal bleeding. History anal fissure by history only, not exam. Uses steroid cream prn. Notes deep hip pain she feels is r/t radiation. No libido and has insertional and deep dyspareunia. Tried lubrication. Has not tried estrogen cream. Trying to use dilator. Continues on Zoloft 100 mg. Has occasional dilator use. Has not had post treatment imaging since completing treatment 7/14.      11/05/14 CT C/A/P impression: 1) Mild circumferential rectal wall thickening and surrounding inflammatory change. Findings are nonspecific. 2) 1.5 cm lytic lesion containing soft tissue and gas in the anterior left scapula is most likely degenerative in etiology and appears nonaggressive. This could be followed with plain film. 3) Resolution of abdominal abscess since prior CT.     1/27/15 PAP/Pelvic. Atypical-glandular cells(NOS). Other Non-Neoplastic Findings: Reactive cellular changes associated with radiation.     2/10/15: Colpo with vaginal cuff bx: FINAL DIAGNOSIS: Vagina, apex, biopsy: -Necrosis, ulceration, and changes consistent with radiation       6/8/16: Pap NIL, HPV negative  11/11/19: Pap NIL  3/28/22: Pap NIL  3/9/23: Pap NIL  4/3/24: Pap pending           Today she comes to clinic     Dilator ~once a week \"if good\"; more so 1 x month for a couple minutes    Constipation:  watching fiber; drinking lots of  water; stool  softeners prn  Annual exam with PCP: 1/24  Mammogram: 12/22  Colonoscopy:2/21 due 26  DEXA: 1/23  Follows with Cardiology for SVT  Follows with Endocrine for DM; has CGM; averaging 120-130's; on Wegovy and Metformin      Review of Systems  See HPI      Past Medical History:    Past Medical History:   Diagnosis Date    AV junctional tachycardia (H24)     History of junctional " tachycardia and SVT with ablation in [place] x 2. Follows with [facility].     Depression     Diabetes (H)     Endocervical adenocarcinoma (H) 2/11/14    Hyperlipidemia     Sleep apnea          Past Surgical History:    Past Surgical History:   Procedure Laterality Date    CARPAL TUNNEL RELEASE RT/LT      CHOLECYSTECTOMY      GYN SURGERY      HYSTERECTOMY TOTAL ABDOMINAL, BILATERAL SALPINGO-OOPHORECTOMY, NODE DISSECTION, COMBINED  2/11/2014    Procedure: COMBINED HYSTERECTOMY TOTAL ABDOMINAL, SALPINGO-OOPHORECTOMY, NODE DISSECTION;  Exploratory Laparotomy, Total Abdominal Hysterectomy, Bilateral Salpingo-Oophrectomy, Pelvic And Para-Aortic Lymph Node Dissection, Exam Under Anesthesia, Omental Biopsy;  Surgeon: Karen Bella MD;  Location:  OR       Current Medications:     Current Outpatient Medications   Medication Sig Dispense Refill    aspirin 81 MG tablet Take 81 mg by mouth daily      atorvastatin (LIPITOR) 40 MG tablet Take 40 mg by mouth daily      Cholecalciferol (VITAMIN D3) 400 UNITS CAPS Take 400 Units by mouth daily      diltiazem (CARDIZEM) 60 MG tablet Take one tablet every 4 hours as needed for Heart rate above 110 beats per minute.      flecainide (TAMBOCOR) 100 MG tablet Take 100 mg by mouth daily.       insulin glargine (LANTUS VIAL) 100 UNIT/ML vial Inject 60 units SQ at bedtime. 1 Month 1    Krill Oil (MAXIMUM RED KRILL PO) Take 1 tablet by mouth daily      lisinopril (ZESTRIL) 10 MG tablet       Magnesium 400 MG CAPS Take 400 mg by mouth      metFORMIN (GLUCOPHAGE) 1000 MG tablet       Multiple Vitamin (MULTI-VITAMINS) TABS Take 1 tablet by mouth daily      NOVOLOG FLEXPEN SOLN 100 UNIT/ML Inject 2.5 units/15 gms CHO with meals and snacks plus correction with meals. 1 Month 4    OZEMPIC, 1 MG/DOSE, 4 MG/3ML pen       SENNA-docusate sodium (SENNA S) 8.6-50 MG tablet Take 1 tablet by mouth At Bedtime Max dose 4 tablets twice per day 1 tablet 3    Ubiquinone 75 MG CAPS Take 75 mg by  "mouth daily           Allergies:        Allergies   Allergen Reactions    Exenatide Rash and Itching    Exenatide Itching and Rash    Morphine Itching    Sulfa Antibiotics Nausea        Social History:     Social History     Tobacco Use    Smoking status: Never    Smokeless tobacco: Never   Substance Use Topics    Alcohol use: No       History   Drug Use No         Family History:     The patient's family history is notable for     Family History   Problem Relation Age of Onset    Cancer Father         colon cancer    Cancer Paternal Grandfather         prostate  cancer         Physical Exam:     /72   Pulse 62   Resp 18   Ht 1.778 m (5' 10\")   Wt 98.9 kg (218 lb)   LMP 12/23/2013   SpO2 98%   BMI 31.28 kg/m    Body mass index is 31.28 kg/m .    General Appearance: healthy and alert, no distress     HEENT: no thyromegaly, no palpable nodules or masses        Cardiovascular: regular rate and rhythm, no gallops, rubs or murmurs     Respiratory: lungs clear, no rales, rhonchi or wheezes, normal diaphragmatic excursion    Musculoskeletal: extremities non tender and without edema    Skin: no lesions or rashes     Neurological: normal gait, no gross defects     Psychiatric: appropriate mood and affect                               Hematological: normal cervical, supraclavicular and inguinal lymph nodes     Gastrointestinal:       abdomen soft, non-tender, non-distended, no organomegaly or masses    Genitourinary: External genitalia and urethral meatus appears normal.  Vagina is foreshortened with slight webbing on right lateral apex; radiation changes present; bleeding with cytobrush  Cervix surgically absent.  Bimanual exam reveal no masses, nodularity or fullness.  Recto-vaginal exam confirms these findings. 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 She completed treatment with surgery, EBRT, and " brachytherapy on 6/11/14.   She is here today for a surveillance visit.     34 minutes spent on the date of the encounter doing chart review, history and exam, documentation and further activities as noted above      Plan:     1.)       Continue annual pelvic/rectal exam. Her annual pap was collected today. Reviewed recommendations from [facility] against performing colposcopy in patients treated for cervical cancer with Pap tests of LSIL or less. Colposcopy for LSIL in this group does not detect recurrence unless there is a visible lesion.   Reviewed signs and symptoms for when she should contact the clinic or seek additional care. Patient to contact the clinic with any questions or concerns in the interim.  Answered all of her questions to the best of my ability.    -discussed using her dilator once every other week x 5 minutes; can increase this to every week the month before her next visit.      2.) Genetic risk factors were assessed and the patient does not meet the qualifications for a referral.      3.) Labs and/or tests ordered include:  Pap.     4.) Health maintenance issues addressed today include annual health maintenance and non-gynecologic issues with PCP.    ZULEMA Hinton, WHNP-BC, ANP-BC, AOCNP  Women's Health Nurse Practitioner  Adult Nurse Practitioner  Division of Gynecologic Oncology        CC  Patient Care Team:  Beatris Parks DO as PCP - General (Family Practice)  Jeanie Nina APRN CNP as Assigned Cancer Care Provider

## 2024-04-03 ENCOUNTER — ONCOLOGY VISIT (OUTPATIENT)
Dept: ONCOLOGY | Facility: CLINIC | Age: 59
End: 2024-04-03
Attending: NURSE PRACTITIONER
Payer: COMMERCIAL

## 2024-04-03 VITALS
DIASTOLIC BLOOD PRESSURE: 72 MMHG | WEIGHT: 218 LBS | SYSTOLIC BLOOD PRESSURE: 116 MMHG | OXYGEN SATURATION: 98 % | HEIGHT: 70 IN | RESPIRATION RATE: 18 BRPM | HEART RATE: 62 BPM | BODY MASS INDEX: 31.21 KG/M2

## 2024-04-03 DIAGNOSIS — C53.9 MALIGNANT NEOPLASM OF CERVIX, UNSPECIFIED SITE (H): Primary | ICD-10-CM

## 2024-04-03 PROCEDURE — 99213 OFFICE O/P EST LOW 20 MIN: CPT | Performed by: NURSE PRACTITIONER

## 2024-04-03 PROCEDURE — 88175 CYTOPATH C/V AUTO FLUID REDO: CPT | Performed by: NURSE PRACTITIONER

## 2024-04-03 PROCEDURE — 99214 OFFICE O/P EST MOD 30 MIN: CPT | Performed by: NURSE PRACTITIONER

## 2024-04-03 ASSESSMENT — PAIN SCALES - GENERAL: PAINLEVEL: NO PAIN (0)

## 2024-04-03 NOTE — NURSING NOTE
"Oncology Rooming Note    April 3, 2024 11:07 AM   Hanh Corea is a 59 year old female who presents for:    Chief Complaint   Patient presents with    Oncology Clinic Visit     Follow up     Initial Vitals: /72   Pulse 62   Resp 18   Ht 1.778 m (5' 10\")   Wt 98.9 kg (218 lb)   LMP 12/23/2013   SpO2 98%   BMI 31.28 kg/m   Estimated body mass index is 31.28 kg/m  as calculated from the following:    Height as of this encounter: 1.778 m (5' 10\").    Weight as of this encounter: 98.9 kg (218 lb). Body surface area is 2.21 meters squared.  No Pain (0) Comment: Data Unavailable   Patient's last menstrual period was 12/23/2013.  Allergies reviewed: Yes  Medications reviewed: Yes    Medications: Medication refills not needed today.  Pharmacy name entered into EPIC: [facility] - [place], [place] - [address] [facility]    Frailty Screening:   Is the patient here for a new oncology consult visit in cancer care? 2. No      Clinical concerns: No Concerns       Jackie Palomo MA            "

## 2024-04-03 NOTE — LETTER
4/3/2024         RE: Hanh Corea  [address] [facility] [facility] [id number]        Dear Colleague,    Thank you for referring your patient, Hanh Corea, to the [facility]. Please see a copy of my visit note below.                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 She completed treatment with surgery, EBRT, and brachytherapy on 14.   She is here today for a surveillance visit.     Oncology history:   Initially presented for evaluation of increased vaginal discharge and spotting between cycles in 2013. She states that she has had some discharge over the last year. The discharge has been odorous, and she needs to wear a pad throughout the day. Sometimes some bleeding noted with the discharge. Her PCP referred her to OB/GYN, and ordered pelvic US. On 13 US showed borderline thickening of endometrial stripe (1.6 cm) which was heterogenous and contained small cystic spaces, Nabothian cysts noted throughout. Uterus measured 9.2 x 6 x 6.9 cm. Endometrial biopsy on 1/15/14, noted that the cervix was extremely friable. Endometrial biopsy showed adenocarcinoma, endometrioid type, villoglandular variant, FIGO grade 1. On 14, she followed up with gyn, who noted that there was an exophytic friable fungating lesion on anterior portion of pt's cervix. Cervical polyp biopsy was obtained. This returned adenocarcinoma, endocervical vs endometrial. 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   14 CT C/A/P Impression:   1. Mild thickening of the lower uterine segment and cervix most likely corresponds to the patient's known endometrial and cervical adenocarcinoma.  2. No evidence for metastasis in the chest, abdomen pelvis.  3. Incidental short segment small bowel-small bowel  intussusception without evidence for small bowel obstruction. Most of this intussusceptions are transient.  2/11/14 Exploratory Laparotomy, total abdominal hysterectomy, bilateral salpingo-oopharectomy, bilateral pelvic and periaortic lymph node dissection, omental biopsy. The surgery was technically difficult due to morbid obesity with BMI of 40.0. Large amount of subcutaneous and intraperitoneal adiposity   SYNOPTIC SUMMARY:  Specimen: Uterus, cervix, bilateral fallopian tubes and ovaries, pelvic and periaortic lymph nodes.  Procedure: Total abdominal hysterectomy, bilateral salpingo-oophorectomy, pelvic and periaortic lymph node dissection, omentectomy.  Tumor size: 2.5 x 1.5 cm.  Tumor site: Endocervix.  Histologic type: Adenocarcinoma.  Histologic grade: G2, well differentiated.  Stromal invasion: Depth: 4 mm.  Horizontal extent: 25 mm.  Margins: Uninvolved by invasive carcinoma.  Lymph-vascular invasion: Not identified.  Pathologic staging (pTNM):  Primary tumor (pT): pT1b1: Clinically visible lesion less than or equal to 4.0 cm in greatest dimension.  Regional lymph nodes (pN): pN0: No regional lymph node metastasis.  Number of lymph nodes examined: 20.  Number of lymph nodes involved: 0.  Distant metastasis (pM): Not applicable.  Additional pathologic findings: Benign endometrial polyp formation and leiomyoma.  2/26/14: On 2/22, she was hospitalized in [place] for ileus.    6/9/14: Radiation oncology consultation. S/P External beam Therapy in [place] subsequently referred for vaginal cuff boost at [facility]. Her indications for postoperative radiotherapy include adeoncarcinoma subtype as well as the fact that she had a simple hysterectomy. She has had 4500 cGy of EBT which was poorly tolerated. The vaginal cuff is a possible site of recurrence and a cuff boost may decrease the risk of recurrence there. Had she had a radical hysterectomy the upper 1/3 of the vagina is taken and in that case I do not usually  "boost with brachy. In this situation vag cuff brachy wasrecommended. Recommended imodium prn diarrhea. Diet modifications help to control stool. Has dilator for use. Anal fissure most irritating; just started steroid cream.     6/11/14: Completed 3 brachytherapy treatments. Mood intermittently depressed but has supportive family. She has recently increased her dose of Zoloft to 100 mg per PCP.   8/27/14: Completed radiation. Diarrhea has resolved. Pain with defecation. No rectal bleeding. History anal fissure by history only, not exam. Uses steroid cream prn. Notes deep hip pain she feels is r/t radiation. No libido and has insertional and deep dyspareunia. Tried lubrication. Has not tried estrogen cream. Trying to use dilator. Continues on Zoloft 100 mg. Has occasional dilator use. Has not had post treatment imaging since completing treatment 7/14.      11/05/14 CT C/A/P impression: 1) Mild circumferential rectal wall thickening and surrounding inflammatory change. Findings are nonspecific. 2) 1.5 cm lytic lesion containing soft tissue and gas in the anterior left scapula is most likely degenerative in etiology and appears nonaggressive. This could be followed with plain film. 3) Resolution of abdominal abscess since prior CT.     1/27/15 PAP/Pelvic. Atypical-glandular cells(NOS). Other Non-Neoplastic Findings: Reactive cellular changes associated with radiation.     2/10/15: Colpo with vaginal cuff bx: FINAL DIAGNOSIS: Vagina, apex, biopsy: -Necrosis, ulceration, and changes consistent with radiation       6/8/16: Pap NIL, HPV negative  11/11/19: Pap NIL  3/28/22: Pap NIL  3/9/23: Pap NIL  4/3/24: Pap pending           Today she comes to clinic     Dilator ~once a week \"if good\"; more so 1 x month for a couple minutes    Constipation:  watching fiber; drinking lots of  water; stool  softeners prn  Annual exam with PCP: 1/24  Mammogram: 12/22  Colonoscopy:2/21 due 26  DEXA: 1/23  Follows with Cardiology for " SVT  Follows with Endocrine for DM; has CGM; averaging 120-130's; on Wegovy and Metformin      Review of Systems  See HPI      Past Medical History:    Past Medical History:   Diagnosis Date     AV junctional tachycardia (H24)     History of junctional tachycardia and SVT with ablation in [place] x 2. Follows with [facility].      Depression      Diabetes (H)      Endocervical adenocarcinoma (H) 2/11/14     Hyperlipidemia      Sleep apnea          Past Surgical History:    Past Surgical History:   Procedure Laterality Date     CARPAL TUNNEL RELEASE RT/LT       CHOLECYSTECTOMY       GYN SURGERY       HYSTERECTOMY TOTAL ABDOMINAL, BILATERAL SALPINGO-OOPHORECTOMY, NODE DISSECTION, COMBINED  2/11/2014    Procedure: COMBINED HYSTERECTOMY TOTAL ABDOMINAL, SALPINGO-OOPHORECTOMY, NODE DISSECTION;  Exploratory Laparotomy, Total Abdominal Hysterectomy, Bilateral Salpingo-Oophrectomy, Pelvic And Para-Aortic Lymph Node Dissection, Exam Under Anesthesia, Omental Biopsy;  Surgeon: Karen Bella MD;  Location:  OR       Current Medications:     Current Outpatient Medications   Medication Sig Dispense Refill     aspirin 81 MG tablet Take 81 mg by mouth daily       atorvastatin (LIPITOR) 40 MG tablet Take 40 mg by mouth daily       Cholecalciferol (VITAMIN D3) 400 UNITS CAPS Take 400 Units by mouth daily       diltiazem (CARDIZEM) 60 MG tablet Take one tablet every 4 hours as needed for Heart rate above 110 beats per minute.       flecainide (TAMBOCOR) 100 MG tablet Take 100 mg by mouth daily.        insulin glargine (LANTUS VIAL) 100 UNIT/ML vial Inject 60 units SQ at bedtime. 1 Month 1     Krill Oil (MAXIMUM RED KRILL PO) Take 1 tablet by mouth daily       lisinopril (ZESTRIL) 10 MG tablet        Magnesium 400 MG CAPS Take 400 mg by mouth       metFORMIN (GLUCOPHAGE) 1000 MG tablet        Multiple Vitamin (MULTI-VITAMINS) TABS Take 1 tablet by mouth daily       NOVOLOG FLEXPEN SOLN 100 UNIT/ML Inject 2.5 units/15 gms  "CHO with meals and snacks plus correction with meals. 1 Month 4     OZEMPIC, 1 MG/DOSE, 4 MG/3ML pen        SENNA-docusate sodium (SENNA S) 8.6-50 MG tablet Take 1 tablet by mouth At Bedtime Max dose 4 tablets twice per day 1 tablet 3     Ubiquinone 75 MG CAPS Take 75 mg by mouth daily           Allergies:        Allergies   Allergen Reactions     Exenatide Rash and Itching     Exenatide Itching and Rash     Morphine Itching     Sulfa Antibiotics Nausea        Social History:     Social History     Tobacco Use     Smoking status: Never     Smokeless tobacco: Never   Substance Use Topics     Alcohol use: No       History   Drug Use No         Family History:     The patient's family history is notable for     Family History   Problem Relation Age of Onset     Cancer Father         colon cancer     Cancer Paternal Grandfather         prostate  cancer         Physical Exam:     /72   Pulse 62   Resp 18   Ht 1.778 m (5' 10\")   Wt 98.9 kg (218 lb)   LMP 12/23/2013   SpO2 98%   BMI 31.28 kg/m    Body mass index is 31.28 kg/m .    General Appearance: healthy and alert, no distress     HEENT: no thyromegaly, no palpable nodules or masses        Cardiovascular: regular rate and rhythm, no gallops, rubs or murmurs     Respiratory: lungs clear, no rales, rhonchi or wheezes, normal diaphragmatic excursion    Musculoskeletal: extremities non tender and without edema    Skin: no lesions or rashes     Neurological: normal gait, no gross defects     Psychiatric: appropriate mood and affect                               Hematological: normal cervical, supraclavicular and inguinal lymph nodes     Gastrointestinal:       abdomen soft, non-tender, non-distended, no organomegaly or masses    Genitourinary: External genitalia and urethral meatus appears normal.  Vagina is foreshortened with slight webbing on right lateral apex; radiation changes present; bleeding with cytobrush  Cervix surgically absent.  Bimanual exam " reveal no masses, nodularity or fullness.  Recto-vaginal exam confirms these findings. 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 She completed treatment with surgery, EBRT, and brachytherapy on 6/11/14.   She is here today for a surveillance visit.     34 minutes spent on the date of the encounter doing chart review, history and exam, documentation and further activities as noted above      Plan:     1.)       Continue annual pelvic/rectal exam. Her annual pap was collected today. Reviewed recommendations from SGO against performing colposcopy in patients treated for cervical cancer with Pap tests of LSIL or less. Colposcopy for LSIL in this group does not detect recurrence unless there is a visible lesion.   Reviewed signs and symptoms for when she should contact the clinic or seek additional care. Patient to contact the clinic with any questions or concerns in the interim.  Answered all of her questions to the best of my ability.    -discussed using her dilator once every other week x 5 minutes; can increase this to every week the month before her next visit.      2.) Genetic risk factors were assessed and the patient does not meet the qualifications for a referral.      3.) Labs and/or tests ordered include:  Pap.     4.) Health maintenance issues addressed today include annual health maintenance and non-gynecologic issues with PCP.    ZULEMA Hinton, WHNP-BC, ANP-BC, AOCNP  Women's Health Nurse Practitioner  Adult Nurse Practitioner  Division of Gynecologic Oncology        CC  Patient Care Team:  Beatris Parks DO as PCP - General (Family Practice)  Jeanie Nina APRN CNP as Assigned Cancer Care Provider      Again, thank you for allowing me to participate in the care of your patient.        Sincerely,        ZULEMA Ryan CNP

## 2024-08-10 ENCOUNTER — HEALTH MAINTENANCE LETTER (OUTPATIENT)
Age: 59
End: 2024-08-10

## 2025-01-26 ENCOUNTER — HEALTH MAINTENANCE LETTER (OUTPATIENT)
Age: 60
End: 2025-01-26

## 2025-02-23 ENCOUNTER — HEALTH MAINTENANCE LETTER (OUTPATIENT)
Age: 60
End: 2025-02-23

## 2025-04-08 NOTE — PROGRESS NOTES
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 She completed treatment with surgery, EBRT, and brachytherapy on 14.    Hanh presents feeling well. She uses her dilator twice per month, but dreads this. Some spotting with dilator use, but no bleeding in between episodes of dilator use.    Notes some bloating and constipation that she associates with semaglutide use- tolerable and not worsening over time.     She denies unintentional weight loss, severe night sweats, new adenopathy or masses, trouble breathing, unexplained and persistent cough, abdominal pain, pelvic pain, nausea/vomiting, bowel changes, bladder changes, abnormal vaginal discharge, new swelling in the legs, or or any other new concerns not listed above.    HEALTH MAINTENANCE:  Breast cancer screening: Mammogram 25; BIRADS 1  Colorectal cancer screening: colonoscopy 2021; repeat due 2026        ONCOLOGY HISTORY:  Initially presented for evaluation of increased vaginal discharge and spotting between cycles in 2013. She states that she has had some discharge over the last year. The discharge has been odorous, and she needs to wear a pad throughout the day. Sometimes some bleeding noted with the discharge. Her PCP referred her to OB/GYN, and ordered pelvic US. On 13 US showed borderline thickening of endometrial stripe (1.6 cm) which was heterogenous and contained small cystic spaces, Nabothian cysts noted throughout. Uterus measured 9.2 x 6 x 6.9 cm. Endometrial biopsy on 1/15/14, noted that the cervix was extremely friable. Endometrial biopsy showed adenocarcinoma, endometrioid type, villoglandular variant, FIGO grade 1. On 14, she followed  up with gyn, who noted that there was an exophytic friable fungating lesion on anterior portion of pt's cervix. Cervical polyp biopsy was obtained. This returned adenocarcinoma, endocervical vs endometrial. 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   1/28/14 CT C/A/P Impression:   1. Mild thickening of the lower uterine segment and cervix most likely corresponds to the patient's known endometrial and cervical adenocarcinoma.  2. No evidence for metastasis in the chest, abdomen pelvis.  3. Incidental short segment small bowel-small bowel intussusception without evidence for small bowel obstruction. Most of this intussusceptions are transient.  2/11/14 Exploratory Laparotomy, total abdominal hysterectomy, bilateral salpingo-oopharectomy, bilateral pelvic and periaortic lymph node dissection, omental biopsy. The surgery was technically difficult due to morbid obesity with BMI of 40.0. Large amount of subcutaneous and intraperitoneal adiposity   SYNOPTIC SUMMARY:  Specimen: Uterus, cervix, bilateral fallopian tubes and ovaries, pelvic and periaortic lymph nodes.  Procedure: Total abdominal hysterectomy, bilateral salpingo-oophorectomy, pelvic and periaortic lymph node dissection, omentectomy.  Tumor size: 2.5 x 1.5 cm.  Tumor site: Endocervix.  Histologic type: Adenocarcinoma.  Histologic grade: G2, well differentiated.  Stromal invasion: Depth: 4 mm.  Horizontal extent: 25 mm.  Margins: Uninvolved by invasive carcinoma.  Lymph-vascular invasion: Not identified.  Pathologic staging (pTNM):  Primary tumor (pT): pT1b1: Clinically visible lesion less than or equal to 4.0 cm in greatest dimension.  Regional lymph nodes (pN): pN0: No regional lymph node metastasis.  Number of lymph nodes examined: 20.  Number of lymph nodes involved: 0.  Distant metastasis (pM): Not applicable.  Additional pathologic findings: Benign endometrial polyp formation and leiomyoma.  2/26/14: On 2/22, she was hospitalized in [place] for  ileus.     6/9/14: Radiation oncology consultation. S/P External beam Therapy in [place] subsequently referred for vaginal cuff boost at [facility]. Her indications for postoperative radiotherapy include adeoncarcinoma subtype as well as the fact that she had a simple hysterectomy. She has had 4500 cGy of EBT which was poorly tolerated. The vaginal cuff is a possible site of recurrence and a cuff boost may decrease the risk of recurrence there. Had she had a radical hysterectomy the upper 1/3 of the vagina is taken and in that case I do not usually boost with brachy. In this situation vag cuff brachy wasrecommended. Recommended imodium prn diarrhea. Diet modifications help to control stool. Has dilator for use. Anal fissure most irritating; just started steroid cream.      6/11/14: Completed 3 brachytherapy treatments. Mood intermittently depressed but has supportive family. She has recently increased her dose of Zoloft to 100 mg per PCP.   8/27/14: Completed radiation. Diarrhea has resolved. Pain with defecation. No rectal bleeding. History anal fissure by history only, not exam. Uses steroid cream prn. Notes deep hip pain she feels is r/t radiation. No libido and has insertional and deep dyspareunia. Tried lubrication. Has not tried estrogen cream. Trying to use dilator. Continues on Zoloft 100 mg. Has occasional dilator use. Has not had post treatment imaging since completing treatment 7/14.      11/05/14 CT C/A/P impression: 1) Mild circumferential rectal wall thickening and surrounding inflammatory change. Findings are nonspecific. 2) 1.5 cm lytic lesion containing soft tissue and gas in the anterior left scapula is most likely degenerative in etiology and appears nonaggressive. This could be followed with plain film. 3) Resolution of abdominal abscess since prior CT.     1/27/15 PAP/Pelvic. Atypical-glandular cells(NOS). Other Non-Neoplastic Findings: Reactive cellular changes associated with radiation.    "  2/10/15: Colpo with vaginal cuff bx: FINAL DIAGNOSIS: Vagina, apex, biopsy: -Necrosis, ulceration, and changes consistent with radiation       6/8/16: Pap NIL, HPV negative  11/11/19: Pap NIL  3/28/22: Pap NIL  3/9/23: Pap NIL  4/3/24: Pap NILM          OBJECTIVE:    PHYSICAL EXAM:  /84   Pulse 74   Resp 16   Ht 1.778 m (5' 10\")   Wt 93.4 kg (205 lb 12.8 oz)   LMP 12/23/2013   SpO2 98%   BMI 29.53 kg/m       CONSTITUTIONAL: Alert non-toxic appearing female in no acute distress  RESPIRATORY: Respiratory effort unlabored  CV/PV: Bilateral lower extremities without edema  GASTROINTESTINAL: Abdomen soft, non-distended, and non-tender to palpation without masses or organomegaly  GENITOURINARY: External genitalia and urethral meatus pink without lesions, masses, or excoriation. Vagina foreshortened with webbing to the R lateral aspect of the vaginal cuff. Slight bleeding with cytobrush use. Vagina pink and smooth without masses or lesions. Cervix surgically absent. Vaginal cuff without masses or lesions. Bimanual exam reveals no masses or fullness. Rectovaginal exam confirms these findings.  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 Pap obtained, results pending.  Continue surveillance with gynecologic oncology team annually with pelvic exam and Pap.  Reviewed signs and symptoms of recurrent disease and when to seek further care.    Treatment/disease related effects:  Vaginal changes: Secondary to radiation therapy. Recommend trial of a silicone based dilator as this may be more tolerable- continue use twice per month, more if she can tolerate this.     Genetics:   Risk factors assessed, " genetic counseling referral not indicated at this time.    Health maintenance:   Follow up with PCP for routine cancer screenings, non-gynecologic concerns, and co-morbid conditions    Patient verbalized understanding of and agreement with plan- see AVS for patient instructions    ZULEMA Vaughn, CNP  Division of Gynecologic Oncology

## 2025-04-09 ENCOUNTER — ONCOLOGY VISIT (OUTPATIENT)
Dept: ONCOLOGY | Facility: CLINIC | Age: 60
End: 2025-04-09
Attending: NURSE PRACTITIONER
Payer: COMMERCIAL

## 2025-04-09 VITALS
HEART RATE: 74 BPM | HEIGHT: 70 IN | DIASTOLIC BLOOD PRESSURE: 84 MMHG | BODY MASS INDEX: 29.46 KG/M2 | RESPIRATION RATE: 16 BRPM | SYSTOLIC BLOOD PRESSURE: 124 MMHG | WEIGHT: 205.8 LBS | OXYGEN SATURATION: 98 %

## 2025-04-09 DIAGNOSIS — Z08 ENCOUNTER FOR FOLLOW-UP SURVEILLANCE OF CERVICAL CANCER: Primary | ICD-10-CM

## 2025-04-09 DIAGNOSIS — Z85.41 ENCOUNTER FOR FOLLOW-UP SURVEILLANCE OF CERVICAL CANCER: Primary | ICD-10-CM

## 2025-04-09 PROCEDURE — 99213 OFFICE O/P EST LOW 20 MIN: CPT | Performed by: NURSE PRACTITIONER

## 2025-04-09 ASSESSMENT — PAIN SCALES - GENERAL: PAINLEVEL_OUTOF10: NO PAIN (0)

## 2025-04-09 NOTE — LETTER
2025      Hanh Corea  [address]  [place] [address]      Dear Colleague,    Thank you for referring your patient, Hanh Corea, to the [facility]. Please see a copy of my visit note below.    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 She completed treatment with surgery, EBRT, and brachytherapy on 14.    Hanh presents feeling well. She uses her dilator twice per month, but dreads this. Some spotting with dilator use, but no bleeding in between episodes of dilator use.    Notes some bloating and constipation that she associates with semaglutide use- tolerable and not worsening over time.     She denies unintentional weight loss, severe night sweats, new adenopathy or masses, trouble breathing, unexplained and persistent cough, abdominal pain, pelvic pain, nausea/vomiting, bowel changes, bladder changes, abnormal vaginal discharge, new swelling in the legs, or or any other new concerns not listed above.    HEALTH MAINTENANCE:  Breast cancer screening: Mammogram 25; BIRADS 1  Colorectal cancer screening: colonoscopy 2021; repeat due 2026        ONCOLOGY HISTORY:  Initially presented for evaluation of increased vaginal discharge and spotting between cycles in 2013. She states that she has had some discharge over the last year. The discharge has been odorous, and she needs to wear a pad throughout the day. Sometimes some bleeding noted with the discharge. Her PCP referred her to OB/GYN, and ordered pelvic US. On 13 US showed borderline thickening of endometrial stripe (1.6 cm) which was heterogenous and contained small cystic spaces, Nabothian cysts noted  throughout. Uterus measured 9.2 x 6 x 6.9 cm. Endometrial biopsy on 1/15/14, noted that the cervix was extremely friable. Endometrial biopsy showed adenocarcinoma, endometrioid type, villoglandular variant, FIGO grade 1. On 1/16/14, she followed up with gyn, who noted that there was an exophytic friable fungating lesion on anterior portion of pt's cervix. Cervical polyp biopsy was obtained. This returned adenocarcinoma, endocervical vs endometrial. 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   1/28/14 CT C/A/P Impression:   1. Mild thickening of the lower uterine segment and cervix most likely corresponds to the patient's known endometrial and cervical adenocarcinoma.  2. No evidence for metastasis in the chest, abdomen pelvis.  3. Incidental short segment small bowel-small bowel intussusception without evidence for small bowel obstruction. Most of this intussusceptions are transient.  2/11/14 Exploratory Laparotomy, total abdominal hysterectomy, bilateral salpingo-oopharectomy, bilateral pelvic and periaortic lymph node dissection, omental biopsy. The surgery was technically difficult due to morbid obesity with BMI of 40.0. Large amount of subcutaneous and intraperitoneal adiposity   SYNOPTIC SUMMARY:  Specimen: Uterus, cervix, bilateral fallopian tubes and ovaries, pelvic and periaortic lymph nodes.  Procedure: Total abdominal hysterectomy, bilateral salpingo-oophorectomy, pelvic and periaortic lymph node dissection, omentectomy.  Tumor size: 2.5 x 1.5 cm.  Tumor site: Endocervix.  Histologic type: Adenocarcinoma.  Histologic grade: G2, well differentiated.  Stromal invasion: Depth: 4 mm.  Horizontal extent: 25 mm.  Margins: Uninvolved by invasive carcinoma.  Lymph-vascular invasion: Not identified.  Pathologic staging (pTNM):  Primary tumor (pT): pT1b1: Clinically visible lesion less than or equal to 4.0 cm in greatest dimension.  Regional lymph nodes (pN): pN0: No regional lymph node metastasis.  Number of  lymph nodes examined: 20.  Number of lymph nodes involved: 0.  Distant metastasis (pM): Not applicable.  Additional pathologic findings: Benign endometrial polyp formation and leiomyoma.  2/26/14: On 2/22, she was hospitalized in [place] for ileus.     6/9/14: Radiation oncology consultation. S/P External beam Therapy in [place] subsequently referred for vaginal cuff boost at [facility]. Her indications for postoperative radiotherapy include adeoncarcinoma subtype as well as the fact that she had a simple hysterectomy. She has had 4500 cGy of EBT which was poorly tolerated. The vaginal cuff is a possible site of recurrence and a cuff boost may decrease the risk of recurrence there. Had she had a radical hysterectomy the upper 1/3 of the vagina is taken and in that case I do not usually boost with brachy. In this situation vag cuff brachy wasrecommended. Recommended imodium prn diarrhea. Diet modifications help to control stool. Has dilator for use. Anal fissure most irritating; just started steroid cream.      6/11/14: Completed 3 brachytherapy treatments. Mood intermittently depressed but has supportive family. She has recently increased her dose of Zoloft to 100 mg per PCP.   8/27/14: Completed radiation. Diarrhea has resolved. Pain with defecation. No rectal bleeding. History anal fissure by history only, not exam. Uses steroid cream prn. Notes deep hip pain she feels is r/t radiation. No libido and has insertional and deep dyspareunia. Tried lubrication. Has not tried estrogen cream. Trying to use dilator. Continues on Zoloft 100 mg. Has occasional dilator use. Has not had post treatment imaging since completing treatment 7/14.      11/05/14 CT C/A/P impression: 1) Mild circumferential rectal wall thickening and surrounding inflammatory change. Findings are nonspecific. 2) 1.5 cm lytic lesion containing soft tissue and gas in the anterior left scapula is most likely degenerative in etiology and appears  "nonaggressive. This could be followed with plain film. 3) Resolution of abdominal abscess since prior CT.     1/27/15 PAP/Pelvic. Atypical-glandular cells(NOS). Other Non-Neoplastic Findings: Reactive cellular changes associated with radiation.     2/10/15: Colpo with vaginal cuff bx: FINAL DIAGNOSIS: Vagina, apex, biopsy: -Necrosis, ulceration, and changes consistent with radiation       6/8/16: Pap NIL, HPV negative  11/11/19: Pap NIL  3/28/22: Pap NIL  3/9/23: Pap NIL  4/3/24: Pap NILM          OBJECTIVE:    PHYSICAL EXAM:  /84   Pulse 74   Resp 16   Ht 1.778 m (5' 10\")   Wt 93.4 kg (205 lb 12.8 oz)   LMP 12/23/2013   SpO2 98%   BMI 29.53 kg/m       CONSTITUTIONAL: Alert non-toxic appearing female in no acute distress  RESPIRATORY: Respiratory effort unlabored  CV/PV: Bilateral lower extremities without edema  GASTROINTESTINAL: Abdomen soft, non-distended, and non-tender to palpation without masses or organomegaly  GENITOURINARY: External genitalia and urethral meatus pink without lesions, masses, or excoriation. Vagina foreshortened with webbing to the R lateral aspect of the vaginal cuff. Slight bleeding with cytobrush use. Vagina pink and smooth without masses or lesions. Cervix surgically absent. Vaginal cuff without masses or lesions. Bimanual exam reveals no masses or fullness. Rectovaginal exam confirms these findings.  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 Pap obtained, results pending.  Continue surveillance with gynecologic oncology team annually with pelvic exam and Pap.  Reviewed signs and symptoms of recurrent disease and when to seek further care.    Treatment/disease " related effects:  Vaginal changes: Secondary to radiation therapy. Recommend trial of a silicone based dilator as this may be more tolerable- continue use twice per month, more if she can tolerate this.     Genetics:   Risk factors assessed, genetic counseling referral not indicated at this time.    Health maintenance:   Follow up with PCP for routine cancer screenings, non-gynecologic concerns, and co-morbid conditions    Patient verbalized understanding of and agreement with plan- see AVS for patient instructions    ZULEMA Vaughn, CNP  Division of Gynecologic Oncology        Again, thank you for allowing me to participate in the care of your patient.        Sincerely,        ZULEMA Vaughn CNP    Electronically signed

## 2025-04-09 NOTE — PATIENT INSTRUCTIONS
Your visit today was with Cathy Dickerson CNP for surveillance.    Plan:  No evidence of disease on today's exam  Return to clinic in 1 year(s) with Cathy Dickerson CNP for surveillance- Pap at that time  Try a silicone dilator to see if this is better tolerated    SIGNS AND SYMPTOMS OF RECURRENT DISEASE    Symptoms of cancer coming back can vary from person to person and it's important to know your own baseline and health factors that may cause symptoms. The following list includes symptoms that would warrant further investigation with your oncology team.    Vaginal bleeding or spotting  Persistent pelvic, abdominal, or bone pain that is persistent and does not improve over time  New persistent dry cough or shortness of breath at rest without a known cause  Unintentional weight loss  New swelling in the legs  New changes in bowel/bladder patterns  New, persistent bloating/fullness  New, persistent onset of drenching night sweats  New, persistent lumps or bumps in the groin or neck  New severe headaches, tunnel vision, double vision, or seizures  Sudden and persistent fatigue without a known cause, particularly if associated with any of the above symptoms    If you have symptoms that are new, persistent, or concerning to you and have been ongoing for two weeks or longer, please contact your oncology care team.    [facility] Gordon triage: [phone]    If you have difficulty reaching triage during off hours, you can call [phone] and ask to speak to the gynecologic oncology resident on call      For urgent questions or concerns, please call rather than send a medical message.

## 2025-04-09 NOTE — NURSING NOTE
"Oncology Rooming Note    April 9, 2025 10:09 AM   Hanh Corea is a 60 year old female who presents for:    Chief Complaint   Patient presents with    Oncology Clinic Visit     1 year follow up     Initial Vitals: /84   Pulse 74   Resp 16   Ht 1.778 m (5' 10\")   Wt 93.4 kg (205 lb 12.8 oz)   LMP 12/23/2013   SpO2 98%   BMI 29.53 kg/m   Estimated body mass index is 29.53 kg/m  as calculated from the following:    Height as of this encounter: 1.778 m (5' 10\").    Weight as of this encounter: 93.4 kg (205 lb 12.8 oz). Body surface area is 2.15 meters squared.  No Pain (0) Comment: Data Unavailable   Patient's last menstrual period was 12/23/2013.  Allergies reviewed: Yes  Medications reviewed: Yes    Medications: Medication refills not needed today.  Pharmacy name entered into EPIC: [facility] - [place], [place] - [address] [facility]    Frailty Screening:   Is the patient here for a new oncology consult visit in cancer care? 2. No    PHQ9:  Did this patient require a PHQ9?: No      Clinical concerns: No new concerns        Cara Smith LPN              "
